# Patient Record
Sex: FEMALE | Race: WHITE | Employment: FULL TIME | URBAN - METROPOLITAN AREA
[De-identification: names, ages, dates, MRNs, and addresses within clinical notes are randomized per-mention and may not be internally consistent; named-entity substitution may affect disease eponyms.]

---

## 2017-03-17 PROBLEM — E07.9 THYROID DISORDER: Status: ACTIVE | Noted: 2017-03-17

## 2017-03-17 PROCEDURE — 86376 MICROSOMAL ANTIBODY EACH: CPT | Performed by: INTERNAL MEDICINE

## 2017-03-17 PROCEDURE — 86800 THYROGLOBULIN ANTIBODY: CPT | Performed by: INTERNAL MEDICINE

## 2017-03-17 PROCEDURE — 84445 ASSAY OF TSI GLOBULIN: CPT | Performed by: INTERNAL MEDICINE

## 2017-03-27 ENCOUNTER — APPOINTMENT (OUTPATIENT)
Dept: GENERAL RADIOLOGY | Facility: HOSPITAL | Age: 22
End: 2017-03-27
Attending: PEDIATRICS
Payer: COMMERCIAL

## 2017-03-27 ENCOUNTER — HOSPITAL ENCOUNTER (EMERGENCY)
Facility: HOSPITAL | Age: 22
Discharge: HOME | End: 2017-03-27
Attending: PEDIATRICS
Payer: COMMERCIAL

## 2017-03-27 VITALS
OXYGEN SATURATION: 98 % | DIASTOLIC BLOOD PRESSURE: 63 MMHG | RESPIRATION RATE: 16 BRPM | HEART RATE: 82 BPM | SYSTOLIC BLOOD PRESSURE: 127 MMHG | TEMPERATURE: 98 F | BODY MASS INDEX: 32.38 KG/M2 | WEIGHT: 171.5 LBS | HEIGHT: 61 IN
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
## 2017-03-27 DIAGNOSIS — S29.011A CHEST WALL MUSCLE STRAIN, INITIAL ENCOUNTER: Primary | ICD-10-CM

## 2017-03-27 LAB
ATRIAL RATE: 89 BPM
P AXIS: 70 DEGREES
P-R INTERVAL: 146 MS
Q-T INTERVAL: 348 MS
QRS DURATION: 82 MS
QTC CALCULATION (BEZET): 423 MS
R AXIS: 79 DEGREES
T AXIS: 41 DEGREES
VENTRICULAR RATE: 89 BPM

## 2017-03-27 PROCEDURE — 99283 EMERGENCY DEPT VISIT LOW MDM: CPT

## 2017-03-27 PROCEDURE — 93010 ELECTROCARDIOGRAM REPORT: CPT

## 2017-03-27 PROCEDURE — 99285 EMERGENCY DEPT VISIT HI MDM: CPT

## 2017-03-27 PROCEDURE — 71020 XR CHEST PA + LAT CHEST (CPT=71020): CPT

## 2017-03-27 PROCEDURE — 93005 ELECTROCARDIOGRAM TRACING: CPT

## 2017-03-27 RX ORDER — IBUPROFEN 600 MG/1
600 TABLET ORAL ONCE
Status: COMPLETED | OUTPATIENT
Start: 2017-03-27 | End: 2017-03-27
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
## 2017-12-16 ENCOUNTER — OFFICE VISIT (OUTPATIENT)
Dept: OBGYN CLINIC | Facility: CLINIC | Age: 22
End: 2017-12-16

## 2017-12-16 VITALS
HEIGHT: 62 IN | WEIGHT: 168 LBS | HEART RATE: 72 BPM | RESPIRATION RATE: 16 BRPM | DIASTOLIC BLOOD PRESSURE: 68 MMHG | BODY MASS INDEX: 30.91 KG/M2 | SYSTOLIC BLOOD PRESSURE: 102 MMHG

## 2017-12-16 DIAGNOSIS — N91.2 AMENORRHEA: Primary | ICD-10-CM

## 2017-12-16 PROBLEM — N91.1 AMENORRHEA, SECONDARY: Status: ACTIVE | Noted: 2017-12-16

## 2017-12-16 PROCEDURE — 99204 OFFICE O/P NEW MOD 45 MIN: CPT | Performed by: OBSTETRICS & GYNECOLOGY

## 2017-12-16 RX ORDER — MEDROXYPROGESTERONE ACETATE 10 MG/1
10 TABLET ORAL DAILY
Qty: 10 TABLET | Refills: 4 | Status: SHIPPED | OUTPATIENT
Start: 2017-12-16 | End: 2020-07-09

## 2017-12-16 NOTE — PROGRESS NOTES
Carolyn Serrato is a 23year old female  Patient's last menstrual period was 2017 (approximate). Patient presents with:  Wellness Visit: annual. new pt.    .Patient had menarche at 17 and her periods were always irregular with clots, she would heartburn, abdominal pain, diarrhea or constipation  Genitourinary:  denies dysuria, incontinence, abnormal vaginal discharge, vaginal itching  Musculoskeletal:  denies back pain. Skin/Breast:  Denies any breast pain, lumps, or discharge.    Neurological:

## 2017-12-19 ENCOUNTER — MED REC SCAN ONLY (OUTPATIENT)
Dept: OBGYN CLINIC | Facility: CLINIC | Age: 22
End: 2017-12-19

## 2020-05-31 ENCOUNTER — HOSPITAL ENCOUNTER (EMERGENCY)
Facility: HOSPITAL | Age: 25
Discharge: HOME | End: 2020-05-31
Attending: EMERGENCY MEDICINE
Payer: MEDICAID

## 2020-05-31 ENCOUNTER — APPOINTMENT (OUTPATIENT)
Dept: GENERAL RADIOLOGY | Facility: HOSPITAL | Age: 25
End: 2020-05-31
Attending: EMERGENCY MEDICINE
Payer: MEDICAID

## 2020-05-31 VITALS
RESPIRATION RATE: 18 BRPM | TEMPERATURE: 98 F | BODY MASS INDEX: 31.63 KG/M2 | DIASTOLIC BLOOD PRESSURE: 45 MMHG | HEART RATE: 89 BPM | WEIGHT: 167.56 LBS | SYSTOLIC BLOOD PRESSURE: 144 MMHG | OXYGEN SATURATION: 100 % | HEIGHT: 61 IN

## 2020-05-31 DIAGNOSIS — S92.512A CLOSED DISPLACED FRACTURE OF PROXIMAL PHALANX OF LESSER TOE OF LEFT FOOT, INITIAL ENCOUNTER: Primary | ICD-10-CM

## 2020-05-31 DIAGNOSIS — T07.XXXA MULTIPLE ABRASIONS: ICD-10-CM

## 2020-05-31 PROCEDURE — 73630 X-RAY EXAM OF FOOT: CPT | Performed by: EMERGENCY MEDICINE

## 2020-05-31 PROCEDURE — 28510 TREATMENT OF TOE FRACTURE: CPT

## 2020-05-31 PROCEDURE — 99283 EMERGENCY DEPT VISIT LOW MDM: CPT

## 2020-05-31 PROCEDURE — 73610 X-RAY EXAM OF ANKLE: CPT | Performed by: EMERGENCY MEDICINE

## 2020-05-31 NOTE — ED PROVIDER NOTES
Patient Seen in: [facility] Emergency Department      History   Patient presents with:  Trauma    Stated Complaint: abrasions, left ankle and toe pain, did not hit head, no LOC    HPI    42-year-old female comes to the hospital complaint of having d Exam    HEENT : NCAT, EOMI, PEERL, throat clear, neck supple and nontender, no JVD, trachea midline, No LAD  Heart: S1S2 normal. No murmurs, regular rate and rhythm, nontender  Lungs: Clear to auscultation bilaterally  Abdomen: Soft nontender nondistended phalanx of the left 5th toe consistent with fracture. This extends to involve the 5th MTP joint. No dislocation.    Dictated by: Leonardo Kruger MD on 5/31/2020 at 4:16 PM     Finalized by: Leonardo Kruger MD on 5/31/2020 at 4:18 PM            Patient's wou
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
## 2020-06-29 DIAGNOSIS — R42 DIZZINESS: Primary | ICD-10-CM

## 2020-07-02 ENCOUNTER — OFFICE VISIT (OUTPATIENT)
Dept: OBGYN CLINIC | Facility: CLINIC | Age: 25
End: 2020-07-02
Payer: MEDICAID

## 2020-07-02 VITALS
WEIGHT: 145 LBS | DIASTOLIC BLOOD PRESSURE: 78 MMHG | SYSTOLIC BLOOD PRESSURE: 120 MMHG | HEIGHT: 62 IN | HEART RATE: 87 BPM | BODY MASS INDEX: 26.68 KG/M2

## 2020-07-02 DIAGNOSIS — Z01.419 ENCOUNTER FOR WELL WOMAN EXAM WITH ROUTINE GYNECOLOGICAL EXAM: Primary | ICD-10-CM

## 2020-07-02 DIAGNOSIS — Z12.4 CERVICAL CANCER SCREENING: ICD-10-CM

## 2020-07-02 PROCEDURE — 88175 CYTOPATH C/V AUTO FLUID REDO: CPT | Performed by: OBSTETRICS & GYNECOLOGY

## 2020-07-02 PROCEDURE — 99395 PREV VISIT EST AGE 18-39: CPT | Performed by: OBSTETRICS & GYNECOLOGY

## 2020-07-02 PROCEDURE — 87491 CHLMYD TRACH DNA AMP PROBE: CPT | Performed by: OBSTETRICS & GYNECOLOGY

## 2020-07-02 PROCEDURE — 87591 N.GONORRHOEAE DNA AMP PROB: CPT | Performed by: OBSTETRICS & GYNECOLOGY
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
## 2020-07-03 LAB
C TRACH DNA SPEC QL NAA+PROBE: NEGATIVE
N GONORRHOEA DNA SPEC QL NAA+PROBE: NEGATIVE

## 2020-07-08 ENCOUNTER — HOSPITAL ENCOUNTER (OUTPATIENT)
Dept: CARDIOLOGY | Age: 25
Discharge: HOME | End: 2020-07-08
Attending: INTERNAL MEDICINE

## 2020-07-08 ENCOUNTER — HOSPITAL ENCOUNTER (EMERGENCY)
Facility: HOSPITAL | Age: 25
Discharge: HOME | End: 2020-07-08
Attending: EMERGENCY MEDICINE
Payer: MEDICAID

## 2020-07-08 VITALS
RESPIRATION RATE: 17 BRPM | HEART RATE: 95 BPM | TEMPERATURE: 98 F | WEIGHT: 145 LBS | SYSTOLIC BLOOD PRESSURE: 129 MMHG | HEIGHT: 62 IN | DIASTOLIC BLOOD PRESSURE: 62 MMHG | BODY MASS INDEX: 26.68 KG/M2 | OXYGEN SATURATION: 100 %

## 2020-07-08 DIAGNOSIS — R42 DIZZINESS: ICD-10-CM

## 2020-07-08 DIAGNOSIS — T78.40XA ALLERGIC REACTION, INITIAL ENCOUNTER: Primary | ICD-10-CM

## 2020-07-08 PROCEDURE — 93306 TTE W/DOPPLER COMPLETE: CPT

## 2020-07-08 PROCEDURE — 96372 THER/PROPH/DIAG INJ SC/IM: CPT

## 2020-07-08 PROCEDURE — 99283 EMERGENCY DEPT VISIT LOW MDM: CPT

## 2020-07-08 RX ORDER — PREDNISONE 20 MG/1
40 TABLET ORAL ONCE
Status: COMPLETED | OUTPATIENT
Start: 2020-07-08 | End: 2020-07-08

## 2020-07-08 RX ORDER — PREDNISONE 20 MG/1
40 TABLET ORAL DAILY
Qty: 8 TABLET | Refills: 0 | Status: ON HOLD | OUTPATIENT
Start: 2020-07-08 | End: 2020-07-11

## 2020-07-08 RX ORDER — DIPHENHYDRAMINE HCL 25 MG
25 CAPSULE ORAL ONCE
Status: COMPLETED | OUTPATIENT
Start: 2020-07-08 | End: 2020-07-08

## 2020-07-08 NOTE — ED PROVIDER NOTES
Patient Seen in: [facility] Emergency Department      History   Patient presents with:  Rash Skin Problem    Stated Complaint: rash, no sob, took 1 benadryl at 5:30pm    HPI 26-year-old female complaining of rash 26-year-old female complaint of rash lesions consistent with urticaria about the trunk arms and legs. ED Course   Labs Reviewed - No data to display       Patient received IM epinephrine and p.o. Benadryl and p.o. prednisone.         MDM     Patient was observed by an hour had significant i

## 2020-07-08 NOTE — ED INITIAL ASSESSMENT (HPI)
Pt to ED with complaints of rash and itching all over. Pt reports taking benadryl at 1730 without relief.

## 2020-07-09 ENCOUNTER — HOSPITAL ENCOUNTER (OUTPATIENT)
Facility: HOSPITAL | Age: 25
Setting detail: OBSERVATION
Discharge: HOME | End: 2020-07-11
Attending: EMERGENCY MEDICINE | Admitting: HOSPITALIST
Payer: MEDICAID

## 2020-07-09 ENCOUNTER — HOSPITAL ENCOUNTER (EMERGENCY)
Facility: HOSPITAL | Age: 25
Discharge: HOME | End: 2020-07-09
Attending: EMERGENCY MEDICINE
Payer: MEDICAID

## 2020-07-09 VITALS
BODY MASS INDEX: 26.68 KG/M2 | RESPIRATION RATE: 18 BRPM | HEART RATE: 88 BPM | OXYGEN SATURATION: 100 % | DIASTOLIC BLOOD PRESSURE: 57 MMHG | HEIGHT: 62 IN | WEIGHT: 145 LBS | SYSTOLIC BLOOD PRESSURE: 127 MMHG | TEMPERATURE: 100 F

## 2020-07-09 DIAGNOSIS — T78.40XD ACUTE ALLERGIC REACTION, SUBSEQUENT ENCOUNTER: Primary | ICD-10-CM

## 2020-07-09 DIAGNOSIS — T78.2XXD ANAPHYLAXIS, SUBSEQUENT ENCOUNTER: Primary | ICD-10-CM

## 2020-07-09 PROBLEM — T78.2XXA ACUTE ANAPHYLAXIS: Status: ACTIVE | Noted: 2020-07-09

## 2020-07-09 LAB
ALBUMIN SERPL-MCNC: 3.3 G/DL (ref 3.4–5)
ALBUMIN/GLOB SERPL: 0.8 {RATIO} (ref 1–2)
ALP LIVER SERPL-CCNC: 56 U/L (ref 52–144)
ALT SERPL-CCNC: 32 U/L (ref 13–56)
ANION GAP SERPL CALC-SCNC: 8 MMOL/L (ref 0–18)
AST SERPL-CCNC: 15 U/L (ref 15–37)
BASOPHILS # BLD AUTO: 0.02 X10(3) UL (ref 0–0.2)
BASOPHILS NFR BLD AUTO: 0.1 %
BILIRUB SERPL-MCNC: 0.4 MG/DL (ref 0.1–2)
BUN BLD-MCNC: 8 MG/DL (ref 7–18)
BUN/CREAT SERPL: 10.7 (ref 10–20)
CALCIUM BLD-MCNC: 9 MG/DL (ref 8.5–10.1)
CHLORIDE SERPL-SCNC: 108 MMOL/L (ref 98–112)
CO2 SERPL-SCNC: 22 MMOL/L (ref 21–32)
CREAT BLD-MCNC: 0.75 MG/DL (ref 0.55–1.02)
DEPRECATED RDW RBC AUTO: 43.8 FL (ref 35.1–46.3)
EOSINOPHIL # BLD AUTO: 0.01 X10(3) UL (ref 0–0.7)
EOSINOPHIL NFR BLD AUTO: 0.1 %
ERYTHROCYTE [DISTWIDTH] IN BLOOD BY AUTOMATED COUNT: 13 % (ref 11–15)
GLOBULIN PLAS-MCNC: 4 G/DL (ref 2.8–4.4)
GLUCOSE BLD-MCNC: 209 MG/DL (ref 70–99)
HCT VFR BLD AUTO: 38.6 % (ref 35–48)
HGB BLD-MCNC: 12.7 G/DL (ref 12–16)
IMM GRANULOCYTES # BLD AUTO: 0.08 X10(3) UL (ref 0–1)
IMM GRANULOCYTES NFR BLD: 0.4 %
LYMPHOCYTES # BLD AUTO: 2.08 X10(3) UL (ref 1–4)
LYMPHOCYTES NFR BLD AUTO: 10.6 %
M PROTEIN MFR SERPL ELPH: 7.3 G/DL (ref 6.4–8.2)
MCH RBC QN AUTO: 30.2 PG (ref 26–34)
MCHC RBC AUTO-ENTMCNC: 32.9 G/DL (ref 31–37)
MCV RBC AUTO: 91.9 FL (ref 80–100)
MONOCYTES # BLD AUTO: 0.21 X10(3) UL (ref 0.1–1)
MONOCYTES NFR BLD AUTO: 1.1 %
NEUTROPHILS # BLD AUTO: 17.28 X10 (3) UL (ref 1.5–7.7)
NEUTROPHILS # BLD AUTO: 17.28 X10(3) UL (ref 1.5–7.7)
NEUTROPHILS NFR BLD AUTO: 87.7 %
OSMOLALITY SERPL CALC.SUM OF ELEC: 290 MOSM/KG (ref 275–295)
PLATELET # BLD AUTO: 388 10(3)UL (ref 150–450)
POCT URINE PREGNANCY: NEGATIVE
POTASSIUM SERPL-SCNC: 3 MMOL/L (ref 3.5–5.1)
PROCEDURE CONTROL: YES
RBC # BLD AUTO: 4.2 X10(6)UL (ref 3.8–5.3)
SARS-COV-2 RNA RESP QL NAA+PROBE: NOT DETECTED
SODIUM SERPL-SCNC: 138 MMOL/L (ref 136–145)
TROPONIN I SERPL-MCNC: <0.045 NG/ML (ref ?–0.04)
WBC # BLD AUTO: 19.7 X10(3) UL (ref 4–11)

## 2020-07-09 PROCEDURE — 96375 TX/PRO/DX INJ NEW DRUG ADDON: CPT

## 2020-07-09 PROCEDURE — 99220 INITIAL OBSERVATION CARE,LEVL III: CPT | Performed by: HOSPITALIST

## 2020-07-09 PROCEDURE — 93005 ELECTROCARDIOGRAM TRACING: CPT

## 2020-07-09 PROCEDURE — 96374 THER/PROPH/DIAG INJ IV PUSH: CPT

## 2020-07-09 PROCEDURE — 93010 ELECTROCARDIOGRAM REPORT: CPT

## 2020-07-09 PROCEDURE — 99285 EMERGENCY DEPT VISIT HI MDM: CPT

## 2020-07-09 PROCEDURE — 99284 EMERGENCY DEPT VISIT MOD MDM: CPT

## 2020-07-09 RX ORDER — METHYLPREDNISOLONE SODIUM SUCCINATE 40 MG/ML
40 INJECTION, POWDER, LYOPHILIZED, FOR SOLUTION INTRAMUSCULAR; INTRAVENOUS EVERY 12 HOURS
Status: DISCONTINUED | OUTPATIENT
Start: 2020-07-09 | End: 2020-07-11

## 2020-07-09 RX ORDER — BIOTIN 1 MG
1 TABLET ORAL DAILY
COMMUNITY

## 2020-07-09 RX ORDER — DIPHENHYDRAMINE HYDROCHLORIDE 50 MG/ML
25 INJECTION INTRAMUSCULAR; INTRAVENOUS ONCE
Status: COMPLETED | OUTPATIENT
Start: 2020-07-09 | End: 2020-07-09

## 2020-07-09 RX ORDER — PREDNISONE 20 MG/1
40 TABLET ORAL ONCE
Status: COMPLETED | OUTPATIENT
Start: 2020-07-09 | End: 2020-07-09

## 2020-07-09 RX ORDER — PREDNISONE 20 MG/1
60 TABLET ORAL DAILY
Qty: 15 TABLET | Refills: 0 | Status: ON HOLD | OUTPATIENT
Start: 2020-07-09 | End: 2020-07-11

## 2020-07-09 RX ORDER — DIPHENHYDRAMINE HYDROCHLORIDE 50 MG/ML
25 INJECTION INTRAMUSCULAR; INTRAVENOUS ONCE
Status: COMPLETED | OUTPATIENT
Start: 2020-07-09 | End: 2020-07-11

## 2020-07-09 RX ORDER — METHYLPREDNISOLONE SODIUM SUCCINATE 125 MG/2ML
125 INJECTION, POWDER, LYOPHILIZED, FOR SOLUTION INTRAMUSCULAR; INTRAVENOUS ONCE
Status: COMPLETED | OUTPATIENT
Start: 2020-07-09 | End: 2020-07-09

## 2020-07-09 RX ORDER — DIPHENHYDRAMINE HYDROCHLORIDE 50 MG/ML
INJECTION INTRAMUSCULAR; INTRAVENOUS
Status: COMPLETED
Start: 2020-07-09 | End: 2020-07-09

## 2020-07-09 RX ORDER — FAMOTIDINE 10 MG/ML
20 INJECTION, SOLUTION INTRAVENOUS ONCE
Status: COMPLETED | OUTPATIENT
Start: 2020-07-09 | End: 2020-07-09

## 2020-07-09 RX ORDER — FAMOTIDINE 10 MG/ML
20 INJECTION, SOLUTION INTRAVENOUS 2 TIMES DAILY
Status: DISCONTINUED | OUTPATIENT
Start: 2020-07-10 | End: 2020-07-11

## 2020-07-09 RX ORDER — ONDANSETRON 2 MG/ML
4 INJECTION INTRAMUSCULAR; INTRAVENOUS EVERY 4 HOURS PRN
Status: DISCONTINUED | OUTPATIENT
Start: 2020-07-09 | End: 2020-07-09

## 2020-07-09 RX ORDER — POTASSIUM CHLORIDE 20 MEQ/1
40 TABLET, EXTENDED RELEASE ORAL EVERY 4 HOURS
Status: COMPLETED | OUTPATIENT
Start: 2020-07-09 | End: 2020-07-10

## 2020-07-09 RX ORDER — ONDANSETRON 2 MG/ML
4 INJECTION INTRAMUSCULAR; INTRAVENOUS EVERY 6 HOURS PRN
Status: DISCONTINUED | OUTPATIENT
Start: 2020-07-09 | End: 2020-07-11

## 2020-07-09 RX ORDER — ACETAMINOPHEN 325 MG/1
650 TABLET ORAL EVERY 6 HOURS PRN
Status: DISCONTINUED | OUTPATIENT
Start: 2020-07-09 | End: 2020-07-11

## 2020-07-09 RX ORDER — DIPHENHYDRAMINE HYDROCHLORIDE 50 MG/ML
25 INJECTION INTRAMUSCULAR; INTRAVENOUS EVERY 6 HOURS
Status: DISCONTINUED | OUTPATIENT
Start: 2020-07-09 | End: 2020-07-11

## 2020-07-09 NOTE — ED INITIAL ASSESSMENT (HPI)
Pt c/o allergic reaction. Pt was here last night for the same thing and was d/c. Today pt noticed a rash on bilateral arms and chest. Pt took benadryl and noticed her throat felt tight, called 911. Given IM epi PTA.  No further breathing complaints, noticea

## 2020-07-09 NOTE — ED PROVIDER NOTES
Patient Seen in: [facility] Emergency Department      History   Patient presents with:   Allergic Rxn Allergies    Stated Complaint: allergic reaction, seen here yesterday for same reaction but worse this morning*    HPI    Patient is a 24-year-old f °C) (Temporal)   Resp 18   Ht 157.5 cm (5' 2\")   Wt 65.8 kg   LMP 06/23/2020   SpO2 100%   BMI 26.52 kg/m²         Physical Exam  Vitals signs and nursing note reviewed. Constitutional:       Appearance: She is well-developed.    HENT:      Head: Normoce noted patient became very tachycardic. Patient is tachypneic and reports she feels her heart racing. Narrow complex tachycardia about 150 on the monitor. EKG as above demonstrates sinus tachycardia, not SVT or atrial fibrillation.   Suspect on a medicati

## 2020-07-09 NOTE — ED PROVIDER NOTES
Patient Seen in: [facility] Emergency Department      History   Patient presents with: Allergic Rxn Allergies    Stated Complaint: allergic reaction    HPI    73-year-old otherwise healthy female presents today for possible allergic reaction.   Giovanna °C) (Temporal)   Resp 17   Ht 157.5 cm (5' 2\")   Wt 67.6 kg   LMP 06/23/2020   SpO2 98%   BMI 27.26 kg/m²         Physical Exam  Vitals signs and nursing note reviewed. Constitutional:       Appearance: Normal appearance.    HENT:      Head: Normocephali (*)     All other components within normal limits   TROPONIN I - Normal   POTASSIUM - Normal   POCT PREGNANCY, URINE - Normal   RAPID SARS-COV-2 BY PCR - Normal   CBC WITH DIFFERENTIAL WITH PLATELET    Narrative:      The following orders were created for p involved direct patient intervention, complex decision making, and/or extensive discussions with the patient, family, and clinical staff.     Admission disposition: 7/9/2020  4:46 PM                 Disposition and Plan     Clinical Impression:  Anaphylaxis

## 2020-07-09 NOTE — ED NOTES
Per edmd pt is able to be discharged after one hour post epi administration, pt states rash is improving, continues to states no shortness or breath, no scratchy throat.  Pt educated on need to come back to be re-evaluated if symptoms worsen or she is have

## 2020-07-09 NOTE — ED NOTES
While giving steroid, patients heart rate increased form 116 to 170's. Steroid injection stopped after 1mL was infused, NS infusion initiated and MD notified who is at bedside w/ EKG completed at this time.

## 2020-07-09 NOTE — ED INITIAL ASSESSMENT (HPI)
Pt here for possible allergic rx to sunscreen ~ started yesterday at 1pm, seen here yesterday and got the allergic reaction meds but reports that reaction got worse since then

## 2020-07-09 NOTE — CONSULTS
[facility] PULMONARY/CRITICAL CARE CONSULTATION       HPI: Lauren Loomis is a 23year old female with a history of OCD, IBS, thyroid disease who presented to the ER yesterday with rash and pruritus.  She was swimming at the beach in [place], started noticing a PAST SURGICAL HISTORY    History reviewed. No pertinent surgical history. HOME MEDICATIONS      Prior to Admission Medications   Prescriptions Last Dose Informant Patient Reported? Taking? Coenzyme Q10 (COQ-10 OR)   Yes No   Sig: Take by mouth daily. Substance and Sexual Activity      Alcohol use: Yes        Comment: socially      Drug use: No      Sexual activity: Yes        Birth control/protection: Pill    Lifestyle      Physical activity:        Days per week: Not on file        Minutes per sessi Abdomen - soft, nontender to palpation. Extremities - No cyanosis, clubbing, edema appreciated. Skin - erythematous rash on legs, arms, neck, face; slightly raised, pruritic.        Labs:    Recent Labs   Lab 07/09/20  1343   WBC 19.7*   HGB 12.7   PLT

## 2020-07-10 LAB
ANION GAP SERPL CALC-SCNC: 2 MMOL/L (ref 0–18)
ATRIAL RATE: 134 BPM
ATRIAL RATE: 152 BPM
BUN BLD-MCNC: 10 MG/DL (ref 7–18)
BUN/CREAT SERPL: 14.3 (ref 10–20)
CALCIUM BLD-MCNC: 8.7 MG/DL (ref 8.5–10.1)
CHLORIDE SERPL-SCNC: 111 MMOL/L (ref 98–112)
CO2 SERPL-SCNC: 26 MMOL/L (ref 21–32)
CREAT BLD-MCNC: 0.7 MG/DL (ref 0.55–1.02)
DEPRECATED RDW RBC AUTO: 46.8 FL (ref 35.1–46.3)
ERYTHROCYTE [DISTWIDTH] IN BLOOD BY AUTOMATED COUNT: 13.2 % (ref 11–15)
GLUCOSE BLD-MCNC: 107 MG/DL (ref 70–99)
HCT VFR BLD AUTO: 36.8 % (ref 35–48)
HGB BLD-MCNC: 11.6 G/DL (ref 12–16)
MCH RBC QN AUTO: 30.4 PG (ref 26–34)
MCHC RBC AUTO-ENTMCNC: 31.5 G/DL (ref 31–37)
MCV RBC AUTO: 96.3 FL (ref 80–100)
OSMOLALITY SERPL CALC.SUM OF ELEC: 288 MOSM/KG (ref 275–295)
P AXIS: 81 DEGREES
P-R INTERVAL: 128 MS
P-R INTERVAL: 132 MS
PLATELET # BLD AUTO: 297 10(3)UL (ref 150–450)
POTASSIUM SERPL-SCNC: 4.9 MMOL/L (ref 3.5–5.1)
POTASSIUM SERPL-SCNC: 4.9 MMOL/L (ref 3.5–5.1)
Q-T INTERVAL: 326 MS
Q-T INTERVAL: 382 MS
QRS DURATION: 78 MS
QRS DURATION: 82 MS
QTC CALCULATION (BEZET): 518 MS
QTC CALCULATION (BEZET): 570 MS
R AXIS: 86 DEGREES
R AXIS: 97 DEGREES
RBC # BLD AUTO: 3.82 X10(6)UL (ref 3.8–5.3)
SODIUM SERPL-SCNC: 139 MMOL/L (ref 136–145)
T AXIS: 23 DEGREES
T AXIS: 23 DEGREES
VENTRICULAR RATE: 134 BPM
VENTRICULAR RATE: 152 BPM
WBC # BLD AUTO: 17.4 X10(3) UL (ref 4–11)

## 2020-07-10 PROCEDURE — 83735 ASSAY OF MAGNESIUM: CPT | Performed by: EMERGENCY MEDICINE

## 2020-07-10 PROCEDURE — 99225 SUBSEQUENT OBSERVATION CARE: CPT | Performed by: HOSPITALIST

## 2020-07-10 PROCEDURE — 83880 ASSAY OF NATRIURETIC PEPTIDE: CPT | Performed by: EMERGENCY MEDICINE

## 2020-07-10 PROCEDURE — 84443 ASSAY THYROID STIM HORMONE: CPT | Performed by: EMERGENCY MEDICINE

## 2020-07-10 RX ORDER — ENOXAPARIN SODIUM 100 MG/ML
40 INJECTION SUBCUTANEOUS DAILY
Status: DISCONTINUED | OUTPATIENT
Start: 2020-07-10 | End: 2020-07-11

## 2020-07-10 RX ORDER — DIPHENHYDRAMINE HYDROCHLORIDE 50 MG/ML
25 INJECTION INTRAMUSCULAR; INTRAVENOUS EVERY 4 HOURS PRN
Status: DISCONTINUED | OUTPATIENT
Start: 2020-07-10 | End: 2020-07-11

## 2020-07-10 NOTE — PLAN OF CARE
Assumed care after RN report; pt resting in bed. Initially w/ rash/redness started on BUE; neck. CC DEE DEEN HAMMAD Licona called; 1x dose of Benadryl given; rash resolved. Afebrile. SR-St per monitor; BP stable. Lungs clear. Up to BR to void.  Updated on [email or web address], will

## 2020-07-10 NOTE — PLAN OF CARE
Assume care in am. Patient awake and alert. Rashes and itching flaring up after two hours past after benadryl was given. Denies swelling of throat or , vital signs stable. Patient up in bathroom. Diet tolerated. Continue to monitor patient progress.    H

## 2020-07-10 NOTE — PROGRESS NOTES
Pulmonary Progress Note     Assessment / Plan:  1. Skin rash/pruritus - seems c/w acute urticaria. DDx includes infection, insect bite/sting (can even be seen with bed bugs), medications, food exposures. Only new exposure is sunscreen.  Anaphylaxis is a con

## 2020-07-10 NOTE — PROGRESS NOTES
Pt received Solumedrol 125 mg in the Ed this morning around 7am.  During administration her HR increased to 150s transiently. She was concerned this was an allergy so listed it on her allergy list on admit this afternoon.       She is due for a dose of 40

## 2020-07-10 NOTE — PROGRESS NOTES
NURSING TRANSFER NOTE      Patient TRANSFERED via Wheelchair  Oriented to room. Safety precautions initiated. Bed in low position. Call light in reach. AOx4. Rashes and itching flares up at times. Benadryl seems to be helping.  Allergy MD to see t

## 2020-07-10 NOTE — PROGRESS NOTES
[facility]  Progress Note     Saddie Castleman Patient Status:  Observation    5/10/1995 MRN [id number]   [facility] 4SW-A Attending Renny Cole, [address] [facility] Day # 0 PCP Niru Velázquez MD     Chief Complaint: Rash    S: Patient seen Epic.    Medications:   • diphenhydrAMINE HCl  25 mg Intravenous Once   • MethylPREDNISolone Sodium Succ  40 mg Intravenous Q12H   • diphenhydrAMINE HCl  25 mg Intravenous Q6H   • famoTIDine  20 mg Intravenous BID       ASSESSMENT / PLAN:     1.  Urticaria

## 2020-07-11 VITALS
HEART RATE: 104 BPM | DIASTOLIC BLOOD PRESSURE: 55 MMHG | RESPIRATION RATE: 22 BRPM | TEMPERATURE: 98 F | OXYGEN SATURATION: 99 % | HEIGHT: 62 IN | SYSTOLIC BLOOD PRESSURE: 119 MMHG | BODY MASS INDEX: 28.16 KG/M2 | WEIGHT: 153 LBS

## 2020-07-11 PROCEDURE — 99217 OBSERVATION CARE DISCHARGE: CPT | Performed by: HOSPITALIST

## 2020-07-11 RX ORDER — PREDNISONE 10 MG/1
10 TABLET ORAL DAILY
Qty: 30 TABLET | Refills: 0 | Status: SHIPPED | OUTPATIENT
Start: 2020-07-11 | End: 2020-07-23

## 2020-07-11 RX ORDER — PREDNISONE 10 MG/1
10 TABLET ORAL DAILY
Qty: 30 TABLET | Refills: 0 | Status: SHIPPED | OUTPATIENT
Start: 2020-07-11 | End: 2020-07-11

## 2020-07-11 RX ORDER — DIPHENHYDRAMINE HCL 25 MG
25 CAPSULE ORAL EVERY 6 HOURS PRN
Qty: 30 CAPSULE | Refills: 0 | Status: SHIPPED | OUTPATIENT
Start: 2020-07-11 | End: 2020-08-31

## 2020-07-11 NOTE — CONSULTS
[facility]    Report of Consultation    Fartun Sushil Patient Status:  Observation    5/10/1995 MRN [id number]   [facility] 5NW-A Attending Suman Espinoza, [address] [facility] Day # 0 PCP Ledy Crooks MD     Date of Admission:  2020  Da [Other]) Father       reports that she has never smoked. She has never used smokeless tobacco. She reports current alcohol use. She reports that she does not use drugs.     Allergies:    Penicillins             RASH    Medications:    Current Facility-Admin cetirizine until seen in clinic. Business card provided, patient to schedule appointment. Recommendations: Thank you for allowing me to participate in the care of your patient.     [facility]  7/11/2020  12:58 PM

## 2020-07-11 NOTE — DISCHARGE SUMMARY
[facility]  DISCHARGE SUMMARY     Kevin Peng Patient Status:  Observation    5/10/1995 MRN [id number]   [facility] 5NW-A Attending No att. providers found   [facility] Day # 0 PCP Mary Hughes MD     Date of Admission: 2020  Da medications      Instructions Prescription details   predniSONE 10 MG Tabs  Commonly known as:  Rubin Wyman  What changed:    · medication strength  · how much to take  · additional instructions  · Another medication with the same name was removed.  Continue

## 2020-07-11 NOTE — PLAN OF CARE
Problem: Patient/Family Goals  Goal: Patient/Family Long Term Goal  Description  - See additional Care Plan goals for specific interventions  Outcome: Progressing  Goal: Patient/Family Short Term Goal  Description  Patient's Short Term Goal:  06/10/2020

## 2020-07-11 NOTE — PROGRESS NOTES
PATIENT A&OX4.  DENIES CHEST PAIN. DENIES SOB OR DIFFICULTY SWALLOWING. SPEAKING IN FULL SENTENCES. HER LUNGS ARE CLEAR. .  NO COUGH, NO FEVER. REDNESS/RASH NOTED TO FACE, NECK AND BOTH ARMS. REPORTS \"THEY ARE ITCHY BUT THE BENADRYL HELPS\".   SHE

## 2020-07-11 NOTE — PROGRESS NOTES
[facility]  Progress Note     Emily Parks Patient Status:  Observation    5/10/1995 MRN [id number]   [facility] 4SW-A Attending Arnie Asif, [address] [facility] Day # 0 PCP Ivana Jackson MD     Chief Complaint: Rash    S: Patient seen enoxaparin  40 mg Subcutaneous Daily   • MethylPREDNISolone Sodium Succ  40 mg Intravenous Q12H   • diphenhydrAMINE HCl  25 mg Intravenous Q6H   • famoTIDine  20 mg Intravenous BID       ASSESSMENT / PLAN:     1. Urticaria  1. Unclear etiology  2.  Steroid/

## 2020-07-11 NOTE — PROGRESS NOTES
Pt will be discharging home, script for prednisone called into pharm, IV and tele dcd. All dc instructions discussed

## 2020-07-12 ENCOUNTER — HOSPITAL ENCOUNTER (EMERGENCY)
Facility: HOSPITAL | Age: 25
Discharge: HOME | End: 2020-07-12
Attending: EMERGENCY MEDICINE
Payer: MEDICAID

## 2020-07-12 ENCOUNTER — APPOINTMENT (OUTPATIENT)
Dept: GENERAL RADIOLOGY | Facility: HOSPITAL | Age: 25
End: 2020-07-12
Attending: EMERGENCY MEDICINE
Payer: MEDICAID

## 2020-07-12 ENCOUNTER — APPOINTMENT (OUTPATIENT)
Dept: CT IMAGING | Facility: HOSPITAL | Age: 25
End: 2020-07-12
Attending: EMERGENCY MEDICINE
Payer: MEDICAID

## 2020-07-12 VITALS
HEART RATE: 67 BPM | DIASTOLIC BLOOD PRESSURE: 59 MMHG | TEMPERATURE: 98 F | BODY MASS INDEX: 26.68 KG/M2 | HEIGHT: 62 IN | RESPIRATION RATE: 18 BRPM | OXYGEN SATURATION: 99 % | WEIGHT: 145 LBS | SYSTOLIC BLOOD PRESSURE: 112 MMHG

## 2020-07-12 DIAGNOSIS — R00.2 PALPITATIONS: ICD-10-CM

## 2020-07-12 DIAGNOSIS — T78.40XA ALLERGIC REACTION, INITIAL ENCOUNTER: Primary | ICD-10-CM

## 2020-07-12 LAB
ALBUMIN SERPL-MCNC: 3.5 G/DL (ref 3.4–5)
ALBUMIN/GLOB SERPL: 0.8 {RATIO} (ref 1–2)
ALP LIVER SERPL-CCNC: 53 U/L (ref 52–144)
ALT SERPL-CCNC: 47 U/L (ref 13–56)
ANION GAP SERPL CALC-SCNC: 5 MMOL/L (ref 0–18)
AST SERPL-CCNC: 28 U/L (ref 15–37)
BILIRUB SERPL-MCNC: 0.5 MG/DL (ref 0.1–2)
BUN BLD-MCNC: 22 MG/DL (ref 7–18)
BUN/CREAT SERPL: 23.4 (ref 10–20)
CALCIUM BLD-MCNC: 9 MG/DL (ref 8.5–10.1)
CHLORIDE SERPL-SCNC: 104 MMOL/L (ref 98–112)
CO2 SERPL-SCNC: 26 MMOL/L (ref 21–32)
CREAT BLD-MCNC: 0.94 MG/DL (ref 0.55–1.02)
D-DIMER: 0.51 UG/ML FEU (ref ?–0.5)
GLOBULIN PLAS-MCNC: 4.2 G/DL (ref 2.8–4.4)
GLUCOSE BLD-MCNC: 72 MG/DL (ref 70–99)
HAV IGM SER QL: 2.3 MG/DL (ref 1.6–2.6)
M PROTEIN MFR SERPL ELPH: 7.7 G/DL (ref 6.4–8.2)
NT-PROBNP SERPL-MCNC: 239 PG/ML (ref ?–125)
OSMOLALITY SERPL CALC.SUM OF ELEC: 282 MOSM/KG (ref 275–295)
POTASSIUM SERPL-SCNC: 3.7 MMOL/L (ref 3.5–5.1)
SODIUM SERPL-SCNC: 135 MMOL/L (ref 136–145)
TROPONIN I SERPL-MCNC: <0.045 NG/ML (ref ?–0.04)
TSI SER-ACNC: 1.01 MIU/ML (ref 0.36–3.74)

## 2020-07-12 PROCEDURE — 96374 THER/PROPH/DIAG INJ IV PUSH: CPT

## 2020-07-12 PROCEDURE — 96375 TX/PRO/DX INJ NEW DRUG ADDON: CPT

## 2020-07-12 PROCEDURE — 84484 ASSAY OF TROPONIN QUANT: CPT | Performed by: EMERGENCY MEDICINE

## 2020-07-12 PROCEDURE — 99284 EMERGENCY DEPT VISIT MOD MDM: CPT

## 2020-07-12 PROCEDURE — 85379 FIBRIN DEGRADATION QUANT: CPT | Performed by: EMERGENCY MEDICINE

## 2020-07-12 PROCEDURE — 99285 EMERGENCY DEPT VISIT HI MDM: CPT

## 2020-07-12 PROCEDURE — 71275 CT ANGIOGRAPHY CHEST: CPT | Performed by: EMERGENCY MEDICINE

## 2020-07-12 PROCEDURE — 80053 COMPREHEN METABOLIC PANEL: CPT | Performed by: EMERGENCY MEDICINE

## 2020-07-12 PROCEDURE — 71045 X-RAY EXAM CHEST 1 VIEW: CPT | Performed by: EMERGENCY MEDICINE

## 2020-07-12 PROCEDURE — S0028 INJECTION, FAMOTIDINE, 20 MG: HCPCS | Performed by: EMERGENCY MEDICINE

## 2020-07-12 RX ORDER — FAMOTIDINE 10 MG/ML
20 INJECTION, SOLUTION INTRAVENOUS ONCE
Status: COMPLETED | OUTPATIENT
Start: 2020-07-12 | End: 2020-07-12

## 2020-07-12 RX ORDER — EPINEPHRINE 0.3 MG/.3ML
0.3 INJECTION SUBCUTANEOUS
Qty: 1 EACH | Refills: 0 | Status: SHIPPED | OUTPATIENT
Start: 2020-07-12 | End: 2020-08-11

## 2020-07-12 RX ORDER — METHYLPREDNISOLONE SODIUM SUCCINATE 125 MG/2ML
125 INJECTION, POWDER, LYOPHILIZED, FOR SOLUTION INTRAMUSCULAR; INTRAVENOUS ONCE
Status: COMPLETED | OUTPATIENT
Start: 2020-07-12 | End: 2020-07-12

## 2020-07-12 NOTE — ED PROVIDER NOTES
Patient Seen in: [facility] Emergency Department      History   Patient presents with:   Allergic Rxn Allergies    Stated Complaint: allergic rxn - rash only    HPI    51-year-old with a history of OCD, panic disorder, presents for reevaluation of an Amenorrhea, secondary    • Hyperhidrosis    • IBS (irritable bowel syndrome)    • OCD (obsessive compulsive disorder)    • Panic disorder    • Thyroid disorder               History reviewed. No pertinent surgical history.                  Social History Abnormal; Notable for the following components:    Pro-Beta Natriuretic Peptide 239 (*)     All other components within normal limits   D-DIMER - Abnormal; Notable for the following components:    D-Dimer 0.51 (*)     All other components within normal freitas no

## 2020-07-12 NOTE — ED INITIAL ASSESSMENT (HPI)
Pt here for rash that has been ongoing for a week. Pt was inpatient Thursday until yesterday when she was d/c. The allergist prescribed medications which she took am doses and the rash came back. Pt was free of the rash upon d/c.

## 2020-08-06 ENCOUNTER — HOSPITAL ENCOUNTER (EMERGENCY)
Facility: HOSPITAL | Age: 25
Discharge: HOME | End: 2020-08-06
Attending: EMERGENCY MEDICINE
Payer: MEDICAID

## 2020-08-06 VITALS
RESPIRATION RATE: 20 BRPM | WEIGHT: 145 LBS | BODY MASS INDEX: 27 KG/M2 | OXYGEN SATURATION: 99 % | SYSTOLIC BLOOD PRESSURE: 122 MMHG | TEMPERATURE: 98 F | DIASTOLIC BLOOD PRESSURE: 64 MMHG | HEART RATE: 79 BPM

## 2020-08-06 DIAGNOSIS — T50.905A ADVERSE EFFECT OF DRUG, INITIAL ENCOUNTER: Primary | ICD-10-CM

## 2020-08-06 DIAGNOSIS — E87.6 HYPOKALEMIA: ICD-10-CM

## 2020-08-06 LAB
ALBUMIN SERPL-MCNC: 3.2 G/DL (ref 3.4–5)
ALBUMIN/GLOB SERPL: 0.8 {RATIO} (ref 1–2)
ALP LIVER SERPL-CCNC: 50 U/L (ref 52–144)
ALT SERPL-CCNC: 21 U/L (ref 13–56)
ANION GAP SERPL CALC-SCNC: 5 MMOL/L (ref 0–18)
AST SERPL-CCNC: 14 U/L (ref 15–37)
ATRIAL RATE: 93 BPM
BASOPHILS # BLD AUTO: 0.03 X10(3) UL (ref 0–0.2)
BASOPHILS NFR BLD AUTO: 0.4 %
BILIRUB SERPL-MCNC: 0.3 MG/DL (ref 0.1–2)
BUN BLD-MCNC: 7 MG/DL (ref 7–18)
BUN/CREAT SERPL: 10.3 (ref 10–20)
CALCIUM BLD-MCNC: 8.9 MG/DL (ref 8.5–10.1)
CHLORIDE SERPL-SCNC: 107 MMOL/L (ref 98–112)
CO2 SERPL-SCNC: 24 MMOL/L (ref 21–32)
CREAT BLD-MCNC: 0.68 MG/DL (ref 0.55–1.02)
DEPRECATED RDW RBC AUTO: 43.4 FL (ref 35.1–46.3)
EOSINOPHIL # BLD AUTO: 0.18 X10(3) UL (ref 0–0.7)
EOSINOPHIL NFR BLD AUTO: 2.3 %
ERYTHROCYTE [DISTWIDTH] IN BLOOD BY AUTOMATED COUNT: 13 % (ref 11–15)
GLOBULIN PLAS-MCNC: 3.8 G/DL (ref 2.8–4.4)
GLUCOSE BLD-MCNC: 109 MG/DL (ref 70–99)
HCT VFR BLD AUTO: 35.7 % (ref 35–48)
HGB BLD-MCNC: 11.6 G/DL (ref 12–16)
IMM GRANULOCYTES # BLD AUTO: 0.02 X10(3) UL (ref 0–1)
IMM GRANULOCYTES NFR BLD: 0.3 %
LYMPHOCYTES # BLD AUTO: 3.16 X10(3) UL (ref 1–4)
LYMPHOCYTES NFR BLD AUTO: 39.5 %
M PROTEIN MFR SERPL ELPH: 7 G/DL (ref 6.4–8.2)
MCH RBC QN AUTO: 29.9 PG (ref 26–34)
MCHC RBC AUTO-ENTMCNC: 32.5 G/DL (ref 31–37)
MCV RBC AUTO: 92 FL (ref 80–100)
MONOCYTES # BLD AUTO: 0.65 X10(3) UL (ref 0.1–1)
MONOCYTES NFR BLD AUTO: 8.1 %
NEUTROPHILS # BLD AUTO: 3.95 X10 (3) UL (ref 1.5–7.7)
NEUTROPHILS # BLD AUTO: 3.95 X10(3) UL (ref 1.5–7.7)
NEUTROPHILS NFR BLD AUTO: 49.4 %
OSMOLALITY SERPL CALC.SUM OF ELEC: 281 MOSM/KG (ref 275–295)
P AXIS: 70 DEGREES
P-R INTERVAL: 162 MS
PLATELET # BLD AUTO: 230 10(3)UL (ref 150–450)
POCT LOT NUMBER: NORMAL
POCT URINE PREGNANCY: NEGATIVE
POTASSIUM SERPL-SCNC: 3.3 MMOL/L (ref 3.5–5.1)
Q-T INTERVAL: 354 MS
QRS DURATION: 84 MS
QTC CALCULATION (BEZET): 440 MS
R AXIS: 86 DEGREES
RBC # BLD AUTO: 3.88 X10(6)UL (ref 3.8–5.3)
SODIUM SERPL-SCNC: 136 MMOL/L (ref 136–145)
T AXIS: 29 DEGREES
TROPONIN I SERPL-MCNC: <0.045 NG/ML (ref ?–0.04)
VENTRICULAR RATE: 93 BPM
WBC # BLD AUTO: 8 X10(3) UL (ref 4–11)

## 2020-08-06 PROCEDURE — 80053 COMPREHEN METABOLIC PANEL: CPT | Performed by: EMERGENCY MEDICINE

## 2020-08-06 PROCEDURE — 85025 COMPLETE CBC W/AUTO DIFF WBC: CPT | Performed by: EMERGENCY MEDICINE

## 2020-08-06 PROCEDURE — 99284 EMERGENCY DEPT VISIT MOD MDM: CPT

## 2020-08-06 PROCEDURE — 84484 ASSAY OF TROPONIN QUANT: CPT | Performed by: EMERGENCY MEDICINE

## 2020-08-06 PROCEDURE — 93005 ELECTROCARDIOGRAM TRACING: CPT

## 2020-08-06 PROCEDURE — 81025 URINE PREGNANCY TEST: CPT

## 2020-08-06 PROCEDURE — 36415 COLL VENOUS BLD VENIPUNCTURE: CPT

## 2020-08-06 RX ORDER — LORAZEPAM 1 MG/1
1 TABLET ORAL ONCE
Status: COMPLETED | OUTPATIENT
Start: 2020-08-06 | End: 2020-08-06

## 2020-08-06 RX ORDER — ONDANSETRON 4 MG/1
4 TABLET, ORALLY DISINTEGRATING ORAL ONCE
Status: COMPLETED | OUTPATIENT
Start: 2020-08-06 | End: 2020-08-06

## 2020-08-06 RX ORDER — POTASSIUM CHLORIDE 20 MEQ/1
20 TABLET, EXTENDED RELEASE ORAL ONCE
Status: COMPLETED | OUTPATIENT
Start: 2020-08-06 | End: 2020-08-06

## 2020-08-06 NOTE — ED INITIAL ASSESSMENT (HPI)
25YF c/c of medication reaction Pt state that she took her mother sertaline 50mg medication and cut in half because she has not been able to fill her own rx.  Pt state that she her today because she feel dizzy and her heart racing

## 2020-08-06 NOTE — ED PROVIDER NOTES
Patient Seen in: [facility] Emergency Department      History   Patient presents with:  Medication Reaction    Stated Complaint: MEDICATION REACTION    HPI  This is a 82-year-old woman, here for evaluation of possible adverse reaction to sertraline HEENT:  Mucous membranes are moist.   Cardiovascular:  Normal rate and regular rhythm. No Edema  Pulmonary:  Pulmonary effort is normal.  Normal breath sounds. No wheezing, rhonchi or rales.    Abdominal: Soft nontender nondistended, no guarding no rebou sinus rhythm, and otherwise unremarkable.   She has taken 2 doses of this medication, have instructed her to stop this medication given the adverse reaction, advised her to follow-up with her primary doctor tomorrow for possible alternatives and for further

## 2020-08-31 ENCOUNTER — HOSPITAL ENCOUNTER (EMERGENCY)
Facility: HOSPITAL | Age: 25
Discharge: HOME | End: 2020-08-31
Attending: PEDIATRICS
Payer: MEDICAID

## 2020-08-31 VITALS
RESPIRATION RATE: 16 BRPM | WEIGHT: 145 LBS | OXYGEN SATURATION: 99 % | HEART RATE: 91 BPM | DIASTOLIC BLOOD PRESSURE: 73 MMHG | SYSTOLIC BLOOD PRESSURE: 120 MMHG | BODY MASS INDEX: 26.68 KG/M2 | TEMPERATURE: 98 F | HEIGHT: 62 IN

## 2020-08-31 DIAGNOSIS — L24.9 IRRITANT CONTACT DERMATITIS, UNSPECIFIED TRIGGER: Primary | ICD-10-CM

## 2020-08-31 PROCEDURE — 99283 EMERGENCY DEPT VISIT LOW MDM: CPT

## 2020-08-31 RX ORDER — CLOBETASOL PROPIONATE 0.5 MG/G
1 CREAM TOPICAL 2 TIMES DAILY
Qty: 1 TUBE | Refills: 0 | Status: SHIPPED | OUTPATIENT
Start: 2020-08-31

## 2020-08-31 NOTE — ED PROVIDER NOTES
Patient Seen in: [facility] Emergency Department      History   Patient presents with: Allergic Rxn Allergies    Stated Complaint: PROGRESSING RASH, ALLERGIC REACTION 1 MONTH AGO IN ICU. WORKS IN COVID UNIT.  NO*    HPI    28-year-old female here wi °C) (Temporal)   Resp 16   Ht 157.5 cm (5' 2\")   Wt 65.8 kg   LMP 08/09/2020   SpO2 99%   BMI 26.52 kg/m²         Physical Exam  Vitals signs and nursing note reviewed. Constitutional:       General: She is not in acute distress.      Appearance: She is place, and time. Cranial Nerves: No cranial nerve deficit. Motor: No abnormal muscle tone. Coordination: Coordination normal.   Psychiatric:         Behavior: Behavior normal.         Thought Content:  Thought content normal.         Judgment 1 Tube Refills: 0

## 2020-08-31 NOTE — ED INITIAL ASSESSMENT (HPI)
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

## 2020-10-23 ENCOUNTER — HOSPITAL ENCOUNTER (EMERGENCY)
Age: 25
Discharge: HOME | End: 2020-10-23
Attending: EMERGENCY MEDICINE

## 2020-10-23 VITALS
OXYGEN SATURATION: 99 % | BODY MASS INDEX: 24.79 KG/M2 | WEIGHT: 134.7 LBS | HEART RATE: 89 BPM | SYSTOLIC BLOOD PRESSURE: 121 MMHG | DIASTOLIC BLOOD PRESSURE: 74 MMHG | TEMPERATURE: 98.2 F | HEIGHT: 62 IN | RESPIRATION RATE: 18 BRPM

## 2020-10-23 DIAGNOSIS — R00.2 PALPITATIONS: Primary | ICD-10-CM

## 2020-10-23 DIAGNOSIS — I49.3 PVC (PREMATURE VENTRICULAR CONTRACTION): ICD-10-CM

## 2020-10-23 LAB
ANION GAP SERPL CALC-SCNC: 11 MMOL/L (ref 10–20)
APPEARANCE UR: CLEAR
BASOPHILS # BLD: 0.1 K/MCL (ref 0–0.3)
BASOPHILS NFR BLD: 1 %
BILIRUB UR QL STRIP: NEGATIVE
BUN SERPL-MCNC: 14 MG/DL (ref 6–20)
BUN/CREAT SERPL: 21 (ref 7–25)
CALCIUM SERPL-MCNC: 9.1 MG/DL (ref 8.4–10.2)
CHLORIDE SERPL-SCNC: 104 MMOL/L (ref 98–107)
CO2 SERPL-SCNC: 27 MMOL/L (ref 21–32)
COLOR UR: YELLOW
CREAT SERPL-MCNC: 0.65 MG/DL (ref 0.51–0.95)
DIFFERENTIAL METHOD BLD: ABNORMAL
EOSINOPHIL # BLD: 0 K/MCL (ref 0.1–0.5)
EOSINOPHIL NFR BLD: 0 %
ERYTHROCYTE [DISTWIDTH] IN BLOOD: 12.3 % (ref 11–15)
GLUCOSE SERPL-MCNC: 77 MG/DL (ref 65–99)
GLUCOSE UR STRIP-MCNC: NEGATIVE MG/DL
HCG UR QL: NEGATIVE
HCT VFR BLD CALC: 40.4 % (ref 36–46.5)
HGB BLD-MCNC: 13 G/DL (ref 12–15.5)
HGB UR QL STRIP: NEGATIVE
IMM GRANULOCYTES # BLD AUTO: 0 K/MCL (ref 0–0.2)
IMM GRANULOCYTES NFR BLD: 0 %
KETONES UR STRIP-MCNC: 15 MG/DL
LEUKOCYTE ESTERASE UR QL STRIP: NEGATIVE
LYMPHOCYTES # BLD: 2.3 K/MCL (ref 1–4.8)
LYMPHOCYTES NFR BLD: 33 %
MAGNESIUM SERPL-MCNC: 2.2 MG/DL (ref 1.7–2.4)
MCH RBC QN AUTO: 29.3 PG (ref 26–34)
MCHC RBC AUTO-ENTMCNC: 32.2 G/DL (ref 32–36.5)
MCV RBC AUTO: 91.2 FL (ref 78–100)
MONOCYTES # BLD: 0.5 K/MCL (ref 0.3–0.9)
MONOCYTES NFR BLD: 7 %
NEUTROPHILS # BLD: 4 K/MCL (ref 1.8–7.7)
NEUTROPHILS NFR BLD: 59 %
NITRITE UR QL STRIP: NEGATIVE
NRBC BLD MANUAL-RTO: 0 /100 WBC
PH UR STRIP: 5.5 UNITS (ref 5–7)
PLATELET # BLD: 319 K/MCL (ref 140–450)
POTASSIUM SERPL-SCNC: 3.8 MMOL/L (ref 3.4–5.1)
PROT UR STRIP-MCNC: NEGATIVE MG/DL
RBC # BLD: 4.43 MIL/MCL (ref 4–5.2)
SODIUM SERPL-SCNC: 138 MMOL/L (ref 135–145)
SP GR UR STRIP: 1.02 (ref 1–1.03)
UROBILINOGEN UR STRIP-MCNC: 0.2 MG/DL (ref 0–1)
WBC # BLD: 6.9 K/MCL (ref 4.2–11)

## 2020-10-23 PROCEDURE — 96360 HYDRATION IV INFUSION INIT: CPT

## 2020-10-23 PROCEDURE — 96361 HYDRATE IV INFUSION ADD-ON: CPT

## 2020-10-23 PROCEDURE — 83735 ASSAY OF MAGNESIUM: CPT

## 2020-10-23 PROCEDURE — 99285 EMERGENCY DEPT VISIT HI MDM: CPT

## 2020-10-23 PROCEDURE — 85025 COMPLETE CBC W/AUTO DIFF WBC: CPT

## 2020-10-23 PROCEDURE — 93005 ELECTROCARDIOGRAM TRACING: CPT | Performed by: EMERGENCY MEDICINE

## 2020-10-23 PROCEDURE — 81003 URINALYSIS AUTO W/O SCOPE: CPT

## 2020-10-23 PROCEDURE — 84703 CHORIONIC GONADOTROPIN ASSAY: CPT

## 2020-10-23 PROCEDURE — 10002807 HB RX 258: Performed by: EMERGENCY MEDICINE

## 2020-10-23 PROCEDURE — 80048 BASIC METABOLIC PNL TOTAL CA: CPT

## 2020-10-23 RX ADMIN — SODIUM CHLORIDE 1000 ML: 9 INJECTION, SOLUTION INTRAVENOUS at 13:41

## 2020-10-23 ASSESSMENT — ENCOUNTER SYMPTOMS
NAUSEA: 0
ABDOMINAL PAIN: 0
LIGHT-HEADEDNESS: 0
CHILLS: 0
BACK PAIN: 0
COUGH: 0
SHORTNESS OF BREATH: 0
ADENOPATHY: 0
SORE THROAT: 0
FEVER: 0
DIARRHEA: 0
DIZZINESS: 0
VOMITING: 0

## 2020-10-23 ASSESSMENT — PAIN SCALES - GENERAL: PAINLEVEL_OUTOF10: 3

## 2020-10-24 LAB
ATRIAL RATE (BPM): 86
P AXIS (DEGREES): 54
PR-INTERVAL (MSEC): 154
QRS-INTERVAL (MSEC): 82
QT-INTERVAL (MSEC): 372
QTC: 445
R AXIS (DEGREES): 86
REPORT TEXT: NORMAL
T AXIS (DEGREES): 31
VENTRICULAR RATE EKG/MIN (BPM): 86

## 2020-11-23 PROBLEM — I49.3 PVC'S (PREMATURE VENTRICULAR CONTRACTIONS): Status: ACTIVE | Noted: 2020-11-23

## 2020-11-23 PROBLEM — R00.2 PALPITATIONS: Status: ACTIVE | Noted: 2020-11-23

## 2020-11-24 ENCOUNTER — OFFICE VISIT (OUTPATIENT)
Dept: CARDIOLOGY | Age: 25
End: 2020-11-24

## 2020-11-24 VITALS
HEIGHT: 62 IN | DIASTOLIC BLOOD PRESSURE: 60 MMHG | BODY MASS INDEX: 28.71 KG/M2 | HEART RATE: 72 BPM | RESPIRATION RATE: 16 BRPM | SYSTOLIC BLOOD PRESSURE: 100 MMHG | WEIGHT: 156 LBS

## 2020-11-24 DIAGNOSIS — I49.3 PVC'S (PREMATURE VENTRICULAR CONTRACTIONS): ICD-10-CM

## 2020-11-24 DIAGNOSIS — R00.2 PALPITATIONS: Primary | ICD-10-CM

## 2020-11-24 PROCEDURE — 99243 OFF/OP CNSLTJ NEW/EST LOW 30: CPT | Performed by: INTERNAL MEDICINE

## 2020-11-24 RX ORDER — CHOLECALCIFEROL (VITAMIN D3) 125 MCG
500 CAPSULE ORAL DAILY
COMMUNITY

## 2020-11-24 RX ORDER — UBIDECARENONE 200 MG
1 CAPSULE ORAL DAILY
COMMUNITY

## 2020-11-24 SDOH — HEALTH STABILITY: PHYSICAL HEALTH: ON AVERAGE, HOW MANY DAYS PER WEEK DO YOU ENGAGE IN MODERATE TO STRENUOUS EXERCISE (LIKE A BRISK WALK)?: 4 DAYS

## 2020-11-24 ASSESSMENT — ENCOUNTER SYMPTOMS
WEIGHT GAIN: 0
SYNCOPE: 0
CHILLS: 0
DEPRESSION: 0
HEMOPTYSIS: 0
BACK PAIN: 0
WEAKNESS: 0
ALLERGIC/IMMUNOLOGIC COMMENTS: NO NEW FOOD ALLERGIES
LIGHT-HEADEDNESS: 1
SUSPICIOUS LESIONS: 0
HEMATOCHEZIA: 0
DIZZINESS: 1
BRUISES/BLEEDS EASILY: 0
FEVER: 0
WEIGHT LOSS: 0
COUGH: 0

## 2020-11-24 ASSESSMENT — PATIENT HEALTH QUESTIONNAIRE - PHQ9
2. FEELING DOWN, DEPRESSED OR HOPELESS: NOT AT ALL
CLINICAL INTERPRETATION OF PHQ2 SCORE: NO FURTHER SCREENING NEEDED
CLINICAL INTERPRETATION OF PHQ9 SCORE: NO FURTHER SCREENING NEEDED
SUM OF ALL RESPONSES TO PHQ9 QUESTIONS 1 AND 2: 0
SUM OF ALL RESPONSES TO PHQ9 QUESTIONS 1 AND 2: 0
1. LITTLE INTEREST OR PLEASURE IN DOING THINGS: NOT AT ALL

## 2020-12-31 ENCOUNTER — IMMUNIZATION (OUTPATIENT)
Dept: LAB | Age: 25
End: 2020-12-31

## 2020-12-31 DIAGNOSIS — Z23 NEED FOR VACCINATION: Primary | ICD-10-CM

## 2020-12-31 PROCEDURE — 91301 COVID-19 MODERNA VACCINE: CPT | Performed by: NURSE PRACTITIONER

## 2020-12-31 PROCEDURE — 0011A COVID-19 MODERNA VACCINE: CPT | Performed by: NURSE PRACTITIONER

## 2021-01-15 ENCOUNTER — HOSPITAL ENCOUNTER (OUTPATIENT)
Dept: CARDIOLOGY | Age: 26
Discharge: HOME | End: 2021-01-15
Attending: INTERNAL MEDICINE

## 2021-01-15 DIAGNOSIS — I49.3 PVC'S (PREMATURE VENTRICULAR CONTRACTIONS): ICD-10-CM

## 2021-01-15 DIAGNOSIS — R00.2 PALPITATIONS: ICD-10-CM

## 2021-01-15 PROCEDURE — 93225 XTRNL ECG REC<48 HRS REC: CPT

## 2021-01-15 PROCEDURE — 93227 XTRNL ECG REC<48 HR R&I: CPT | Performed by: INTERNAL MEDICINE

## 2021-01-28 ENCOUNTER — IMMUNIZATION (OUTPATIENT)
Dept: LAB | Age: 26
End: 2021-01-28
Attending: HOSPITALIST

## 2021-01-28 ENCOUNTER — OFFICE VISIT (OUTPATIENT)
Dept: CARDIOLOGY | Age: 26
End: 2021-01-28

## 2021-01-28 VITALS
SYSTOLIC BLOOD PRESSURE: 108 MMHG | HEIGHT: 62 IN | DIASTOLIC BLOOD PRESSURE: 60 MMHG | BODY MASS INDEX: 29.26 KG/M2 | WEIGHT: 159 LBS | HEART RATE: 64 BPM

## 2021-01-28 DIAGNOSIS — Z23 NEED FOR VACCINATION: Primary | ICD-10-CM

## 2021-01-28 DIAGNOSIS — R00.2 PALPITATIONS: Primary | ICD-10-CM

## 2021-01-28 DIAGNOSIS — I49.3 PVC'S (PREMATURE VENTRICULAR CONTRACTIONS): ICD-10-CM

## 2021-01-28 PROCEDURE — 91301 COVID-19 MODERNA VACCINE: CPT

## 2021-01-28 PROCEDURE — 99212 OFFICE O/P EST SF 10 MIN: CPT | Performed by: INTERNAL MEDICINE

## 2021-01-28 PROCEDURE — 0012A COVID-19 MODERNA VACCINE: CPT

## 2021-01-28 SDOH — HEALTH STABILITY: PHYSICAL HEALTH: ON AVERAGE, HOW MANY DAYS PER WEEK DO YOU ENGAGE IN MODERATE TO STRENUOUS EXERCISE (LIKE A BRISK WALK)?: 4 DAYS

## 2021-01-28 ASSESSMENT — ENCOUNTER SYMPTOMS
SHORTNESS OF BREATH: 0
HEMATOCHEZIA: 0
HEMOPTYSIS: 0
COUGH: 0
CHILLS: 0
ALLERGIC/IMMUNOLOGIC COMMENTS: NO NEW FOOD ALLERGIES
FEVER: 0
WEIGHT GAIN: 0
LIGHT-HEADEDNESS: 1
WEIGHT LOSS: 0
SUSPICIOUS LESIONS: 0
BRUISES/BLEEDS EASILY: 0

## 2021-02-23 ENCOUNTER — HOSPITAL ENCOUNTER (OUTPATIENT)
Dept: ULTRASOUND IMAGING | Age: 26
Discharge: HOME | End: 2021-02-23
Attending: INTERNAL MEDICINE

## 2021-02-23 DIAGNOSIS — E04.9 ENLARGED THYROID: ICD-10-CM

## 2021-02-23 PROCEDURE — 76536 US EXAM OF HEAD AND NECK: CPT

## 2021-02-25 ENCOUNTER — TELEPHONE (OUTPATIENT)
Dept: SURGERY | Age: 26
End: 2021-02-25

## 2021-07-19 ENCOUNTER — HOSPITAL ENCOUNTER (EMERGENCY)
Facility: HOSPITAL | Age: 26
Discharge: HOME | End: 2021-07-19
Attending: EMERGENCY MEDICINE
Payer: MEDICAID

## 2021-07-19 VITALS
RESPIRATION RATE: 18 BRPM | OXYGEN SATURATION: 98 % | DIASTOLIC BLOOD PRESSURE: 50 MMHG | WEIGHT: 156 LBS | SYSTOLIC BLOOD PRESSURE: 119 MMHG | BODY MASS INDEX: 29 KG/M2 | HEART RATE: 87 BPM | TEMPERATURE: 98 F

## 2021-07-19 DIAGNOSIS — B35.4 TINEA CORPORIS: Primary | ICD-10-CM

## 2021-07-19 PROCEDURE — 99283 EMERGENCY DEPT VISIT LOW MDM: CPT

## 2021-07-19 RX ORDER — CLOTRIMAZOLE 1 %
CREAM (GRAM) TOPICAL
Qty: 1 EACH | Refills: 0 | Status: SHIPPED | OUTPATIENT
Start: 2021-07-19

## 2021-07-20 NOTE — ED PROVIDER NOTES
Patient Seen in: [facility] Emergency Department      History   Patient presents with:  Rash Skin Problem    Stated Complaint: RASH TO R HAND    HPI/Subjective:   HPI    Patient is 24-year-old says she noticed a rash on her right hand been there few perfused, without cyanosis. There are 2 circular patches daily erythematous rash on the back of the right hand. One has a central clearing that looks like ringworm. NEUROLOGIC: Cranial nerves II through XII are intact moving all extremities normally.   Ayla Torres

## 2021-07-20 NOTE — ED INITIAL ASSESSMENT (HPI)
Pt to the emergency room for two ring like areas of redness that appeared yesterday. 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  Pt reports affected area is itchy but not painful

## 2022-02-20 NOTE — MR AVS SNAPSHOT
After Visit Summary   7/2/2020    Darvin Brown    MRN: [id number]           Visit Information     Date & Time  7/2/2020 10:45 AM Provider  [facility]  [address]  Dept[address]  Phone  [phone]      [facility]                                                           Daily Progress Note                                                                 Pt Name: Jamie Acevedo Record Number: [id number]  Date of Birth 1944   Today's Date: 2/20/2022      ASSESSMENT/PLAN  GI bleed s/p sigmoid colectomy 1/26/22  -flex sig 2/17 friable surgical anastomosis 18 cm from rectal verge, two oozing areas with hemoclips placed.   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 Continue to trend every 6 hours. If stable through today with no signs of ongoing bleeding, OK to start heparin gtt tomorrow for PE.  - PE with heart strain - heparin gtt on hold. Off of pressors. LLE DVT seen on venous duplex  - ESRD - HD per nephrology          Subjective  Mr. Yordan Roth had a small amount of rectal bleeding overnight. His vitals have remained stable. He denies any abdominal pain. OBJECTIVE  VITALS:   Vitals:    02/20/22 0400 02/20/22 0500 02/20/22 0600 02/20/22 0812   BP: (!) 124/101 (!) 127/58     Pulse: 90 90     Resp: 15 15  18   Temp: 98.7 °F (37.1 °C)      TempSrc: Oral      SpO2: 97%   93%   Weight:   225 lb 5 oz (102.2 kg)    Height:         I/O last 3 completed shifts: In: 4665.5 [I.V.:1478.2; Blood:2587.8;  IV Piggyback:599.5]  Out: -   GENERAL: alert, no distress  Pulmonary: normal respiratory effort, no accessory muscle use  ABDOMEN: soft, NT, ND  Ext: no cyanosis or clubbing          LABS  CBC:   Lab Results   Component Value Date    WBC 6.0 02/20/2022    RBC 2.58 02/20/2022    HGB 7.8 02/20/2022    HCT 23.4 02/20/2022    MCV 90.6 02/20/2022    MCH 30.1 02/20/2022    MCHC 33.2 02/20/2022    RDW 14.5 02/20/2022     02/20/2022    MPV 6.9 02/20/2022     BMP:    Lab Results   Component Value Date     02/20/2022    K 4.7 02/20/2022    K 4.3 02/12/2022     02/20/2022    CO2 23 02/20/2022    BUN 30 02/20/2022    LABALBU 2.5 02/12/2022    CREATININE 5.5 02/20/2022    CALCIUM 8.1 02/20/2022    GFRAA 12 02/20/2022    GFRAA 35 07/26/2011    LABGLOM 10 02/20/2022    GLUCOSE 91 02/20/2022         Sarai Gonzalez MD  Electronically signed 2/20/2022 at 8:38 AM Tips for making healthy food choices    Enjoy your food, but eat less. Fully enjoy your food when eating. Don’t eat while distracted and slow down. Avoid over sized portions. Don’t eat while when you’re bored.      EAT THESE FOODS MORE OFTEN: EAT THE [email or web address]                   DO YOU KNOW WHERE TO GO? Injury & Illness are never convenient. If you are dealing with a   non-emergency, consider your options before heading to an ER.   VIDEO VISITS  Visit face-to-face with visit [email or web address] or call [phone]. ([phone])

## 2022-03-17 ENCOUNTER — OFFICE VISIT (OUTPATIENT)
Dept: OBGYN CLINIC | Facility: CLINIC | Age: 27
End: 2022-03-17
Payer: MEDICAID

## 2022-03-17 VITALS
DIASTOLIC BLOOD PRESSURE: 66 MMHG | HEART RATE: 95 BPM | WEIGHT: 157.63 LBS | BODY MASS INDEX: 29.01 KG/M2 | SYSTOLIC BLOOD PRESSURE: 112 MMHG | HEIGHT: 62 IN

## 2022-03-17 DIAGNOSIS — Z12.4 CERVICAL CANCER SCREENING: ICD-10-CM

## 2022-03-17 DIAGNOSIS — Z01.419 ENCOUNTER FOR WELL WOMAN EXAM WITH ROUTINE GYNECOLOGICAL EXAM: Primary | ICD-10-CM

## 2022-03-17 LAB
CYTOLOGY CVX/VAG DOC THIN PREP: NORMAL
HPV16+18+45 E6+E7MRNA CVX NAA+PROBE: NEGATIVE

## 2022-03-17 PROCEDURE — 99395 PREV VISIT EST AGE 18-39: CPT | Performed by: OBSTETRICS & GYNECOLOGY

## 2022-03-17 PROCEDURE — 3008F BODY MASS INDEX DOCD: CPT | Performed by: OBSTETRICS & GYNECOLOGY

## 2022-03-17 PROCEDURE — 3078F DIAST BP <80 MM HG: CPT | Performed by: OBSTETRICS & GYNECOLOGY

## 2022-03-17 PROCEDURE — 88175 CYTOPATH C/V AUTO FLUID REDO: CPT | Performed by: OBSTETRICS & GYNECOLOGY

## 2022-03-17 PROCEDURE — 3074F SYST BP LT 130 MM HG: CPT | Performed by: OBSTETRICS & GYNECOLOGY

## 2022-10-26 ENCOUNTER — OFFICE VISIT (OUTPATIENT)
Dept: OBGYN CLINIC | Facility: CLINIC | Age: 27
End: 2022-10-26
Payer: MEDICAID

## 2022-10-26 VITALS
BODY MASS INDEX: 30.91 KG/M2 | DIASTOLIC BLOOD PRESSURE: 65 MMHG | HEART RATE: 88 BPM | SYSTOLIC BLOOD PRESSURE: 118 MMHG | WEIGHT: 168 LBS | HEIGHT: 62 IN

## 2022-10-26 DIAGNOSIS — L65.9 HAIR LOSS: ICD-10-CM

## 2022-10-26 DIAGNOSIS — N92.6 IRREGULAR MENSES: Primary | ICD-10-CM

## 2022-10-26 PROCEDURE — 3008F BODY MASS INDEX DOCD: CPT | Performed by: OBSTETRICS & GYNECOLOGY

## 2022-10-26 PROCEDURE — 3074F SYST BP LT 130 MM HG: CPT | Performed by: OBSTETRICS & GYNECOLOGY

## 2022-10-26 PROCEDURE — 99213 OFFICE O/P EST LOW 20 MIN: CPT | Performed by: OBSTETRICS & GYNECOLOGY

## 2022-10-26 PROCEDURE — 3078F DIAST BP <80 MM HG: CPT | Performed by: OBSTETRICS & GYNECOLOGY

## 2023-08-16 ENCOUNTER — OFFICE VISIT (OUTPATIENT)
Dept: OCCUPATIONAL MEDICINE | Age: 28
End: 2023-08-16
Attending: FAMILY MEDICINE

## 2023-08-16 DIAGNOSIS — Z01.84 IMMUNITY STATUS TESTING: Primary | ICD-10-CM

## 2023-08-16 PROCEDURE — 86765 RUBEOLA ANTIBODY: CPT

## 2023-08-16 PROCEDURE — 86787 VARICELLA-ZOSTER ANTIBODY: CPT

## 2023-08-16 PROCEDURE — 86762 RUBELLA ANTIBODY: CPT

## 2023-08-16 PROCEDURE — 86735 MUMPS ANTIBODY: CPT

## 2023-08-16 PROCEDURE — 86480 TB TEST CELL IMMUN MEASURE: CPT

## 2023-08-16 PROCEDURE — 86706 HEP B SURFACE ANTIBODY: CPT

## 2023-08-17 LAB
HBV SURFACE AB SER QL: NONREACTIVE
HBV SURFACE AB SERPL IA-ACNC: <3.1 MIU/ML
M TB IFN-G CD4+ T-CELLS BLD-ACNC: 0.02 IU/ML
M TB TUBERC IFN-G BLD QL: NEGATIVE
M TB TUBERC IGNF/MITOGEN IGNF CONTROL: >10 IU/ML
QFT TB1 AG MINUS NIL: 0.01 IU/ML
QFT TB2 AG MINUS NIL: 0.02 IU/ML
RUBV IGG SER QL: NEGATIVE
RUBV IGG SER-ACNC: <0.2 IU/ML (ref 10–?)

## 2023-08-18 LAB
MEV IGG SER-ACNC: <5 AU/ML (ref 16.5–?)
MUV IGG SER IA-ACNC: 78.6 AU/ML (ref 11–?)
VZV IGG SER IA-ACNC: 185.6 (ref 165–?)

## 2023-08-19 ENCOUNTER — OFFICE VISIT (OUTPATIENT)
Dept: OCCUPATIONAL MEDICINE | Age: 28
End: 2023-08-19
Attending: PHYSICIAN ASSISTANT

## 2023-08-22 ENCOUNTER — OFFICE VISIT (OUTPATIENT)
Dept: OCCUPATIONAL MEDICINE | Age: 28
End: 2023-08-22
Attending: PHYSICIAN ASSISTANT

## 2023-10-19 DIAGNOSIS — R74.01 ELEVATED TRANSAMINASE LEVEL: ICD-10-CM

## 2023-10-19 DIAGNOSIS — R74.8 ALKALINE PHOSPHATASE RAISED: Primary | ICD-10-CM

## 2023-11-18 ENCOUNTER — HOSPITAL ENCOUNTER (OUTPATIENT)
Dept: ULTRASOUND IMAGING | Age: 28
Discharge: HOME | End: 2023-11-18
Attending: INTERNAL MEDICINE

## 2023-11-18 DIAGNOSIS — R74.8 ALKALINE PHOSPHATASE RAISED: ICD-10-CM

## 2023-11-18 DIAGNOSIS — R74.01 ELEVATED TRANSAMINASE LEVEL: ICD-10-CM

## 2023-11-18 PROCEDURE — 76705 ECHO EXAM OF ABDOMEN: CPT

## 2024-04-30 ENCOUNTER — TELEPHONE (OUTPATIENT)
Dept: FAMILY MEDICINE | Age: 29
End: 2024-04-30

## 2024-05-01 ENCOUNTER — APPOINTMENT (OUTPATIENT)
Dept: FAMILY MEDICINE | Age: 29
End: 2024-05-01

## 2024-05-01 VITALS
OXYGEN SATURATION: 97 % | WEIGHT: 197 LBS | DIASTOLIC BLOOD PRESSURE: 75 MMHG | SYSTOLIC BLOOD PRESSURE: 120 MMHG | BODY MASS INDEX: 34.91 KG/M2 | RESPIRATION RATE: 16 BRPM | HEIGHT: 63 IN | HEART RATE: 90 BPM | TEMPERATURE: 98.2 F

## 2024-05-01 DIAGNOSIS — Z00.00 ANNUAL PHYSICAL EXAM: Primary | ICD-10-CM

## 2024-05-01 DIAGNOSIS — E66.09 CLASS 2 OBESITY DUE TO EXCESS CALORIES WITHOUT SERIOUS COMORBIDITY WITH BODY MASS INDEX (BMI) OF 35.0 TO 35.9 IN ADULT: ICD-10-CM

## 2024-05-01 PROCEDURE — 99385 PREV VISIT NEW AGE 18-39: CPT | Performed by: FAMILY MEDICINE

## 2024-05-01 RX ORDER — SEMAGLUTIDE 0.5 MG/.5ML
INJECTION, SOLUTION SUBCUTANEOUS
COMMUNITY

## 2024-05-01 RX ORDER — EPINEPHRINE 0.3 MG/.3ML
INJECTION SUBCUTANEOUS
COMMUNITY

## 2024-05-01 RX ORDER — ONDANSETRON 4 MG/1
TABLET, ORALLY DISINTEGRATING ORAL
COMMUNITY
Start: 2024-04-30

## 2024-05-01 ASSESSMENT — PATIENT HEALTH QUESTIONNAIRE - PHQ9
CLINICAL INTERPRETATION OF PHQ2 SCORE: NO FURTHER SCREENING NEEDED
SUM OF ALL RESPONSES TO PHQ9 QUESTIONS 1 AND 2: 0
2. FEELING DOWN, DEPRESSED OR HOPELESS: NOT AT ALL
1. LITTLE INTEREST OR PLEASURE IN DOING THINGS: NOT AT ALL
SUM OF ALL RESPONSES TO PHQ9 QUESTIONS 1 AND 2: 0

## 2024-08-09 ENCOUNTER — HOSPITAL ENCOUNTER (OUTPATIENT)
Age: 29
Discharge: HOME | End: 2024-08-09
Payer: MEDICAID

## 2024-08-09 VITALS
RESPIRATION RATE: 18 BRPM | SYSTOLIC BLOOD PRESSURE: 119 MMHG | TEMPERATURE: 98 F | OXYGEN SATURATION: 99 % | DIASTOLIC BLOOD PRESSURE: 63 MMHG | HEART RATE: 95 BPM

## 2024-08-09 DIAGNOSIS — R05.1 ACUTE COUGH: ICD-10-CM

## 2024-08-09 DIAGNOSIS — Z20.822 ENCOUNTER FOR LABORATORY TESTING FOR COVID-19 VIRUS: ICD-10-CM

## 2024-08-09 DIAGNOSIS — U07.1 COVID-19 VIRUS INFECTION: Primary | ICD-10-CM

## 2024-08-09 LAB — SARS-COV-2 RNA RESP QL NAA+PROBE: DETECTED

## 2024-08-09 PROCEDURE — U0002 COVID-19 LAB TEST NON-CDC: HCPCS | Performed by: PHYSICIAN ASSISTANT

## 2024-08-09 PROCEDURE — 99213 OFFICE O/P EST LOW 20 MIN: CPT | Performed by: PHYSICIAN ASSISTANT

## 2024-08-09 NOTE — ED PROVIDER NOTES
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  Onset: 3 days.  Associated subjective fever, chills, sweats, nasal congestion, chest congestion, cough.  Taking over-the-counter multi symptoms cold medication with minimal improvement.  Coming to immediate care for further evaluation.  Home COVID testing negative.  Afebrile in clinic.  No chest pain or shortness of breath.  No lethargy, weakness, confusion.  Not immunocompromise.  Works in healthcare.  Recent COVID exposure.    Objective:   No pertinent past medical history.            No pertinent past surgical history.              No pertinent social history.            Review of Systems   Constitutional:  Negative for chills and fever.   HENT:  Positive for congestion.    Respiratory:  Positive for cough.    Gastrointestinal:  Negative for abdominal pain, nausea and vomiting.   Musculoskeletal:  Negative for back pain, gait problem, neck pain and neck stiffness.   Skin:  Negative for rash.   Allergic/Immunologic: Negative for immunocompromised state.   Psychiatric/Behavioral:  Negative for confusion.        Positive for stated Chief Complaint: Nasal Congestion and Cough    Other systems are as noted in HPI.  Constitutional and vital signs reviewed.      All other systems reviewed and negative except as noted above.    Physical Exam     ED Triage Vitals [08/09/24 1117]   /63   Pulse 95   Resp 18   Temp 97.5 °F (36.4 °C)   Temp src Temporal   SpO2 99 %   O2 Device None (Room air)       Current Vitals:   Vital Signs  BP: 119/63  Pulse: 95  Resp: 18  Temp: 97.5 °F (36.4 °C)  Temp src: Temporal    Oxygen Therapy  SpO2: 99 %  O2 Device: None (Room  air)            Physical Exam  Vitals and nursing note reviewed.   Constitutional:       General: She is not in acute distress.     Appearance: Normal appearance. She is well-developed. She is not ill-appearing, toxic-appearing or diaphoretic.   HENT:      Head: Normocephalic and atraumatic.      Nose: Congestion present.      Mouth/Throat:      Mouth: Mucous membranes are moist.   Eyes:      General: No scleral icterus.  Cardiovascular:      Rate and Rhythm: Normal rate and regular rhythm.   Pulmonary:      Effort: Pulmonary effort is normal. No respiratory distress.      Breath sounds: Normal breath sounds. No wheezing or rales.   Musculoskeletal:         General: Normal range of motion.   Skin:     Findings: No rash.   Neurological:      General: No focal deficit present.      Mental Status: She is alert and oriented to person, place, and time.      Motor: No weakness.      Coordination: Coordination normal.   Psychiatric:         Mood and Affect: Mood normal.         Behavior: Behavior normal.           ED Course     Labs Reviewed   RAPID SARS-COV-2 BY PCR - Abnormal; Notable for the following components:       Result Value    Rapid SARS-CoV-2 by PCR Detected (*)     All other components within normal limits     Results for orders placed or performed during the hospital encounter of 08/09/24   Rapid SARS-CoV-2 by PCR    Collection Time: 08/09/24 11:26 AM    Specimen: Nares; Other   Result Value Ref Range    Rapid SARS-CoV-2 by PCR Detected (A) Not Detected            MDM     Dx: COVID Virus Infection, Initial Encounter  COVID PCR Positive  No severe disease  No hypoxia  Not requiring hospitalization  Lungs: Clear to Auscultation Bilaterally  No chest pain or shortness of breath  Overall well-appearing  Hemodynamically stable  Afebrile  Tolerating PO  Outpatient management  Supportive care  Quarantine/Isolation  Rest  Oral hydration  Motrin/Tylenol as needed for pain/fever/myalgias  Tessalon and  DM for  Anti-tussive  Risks/Benefits of oral antiviral Paxlovid discussed   Declined Paxlovid oral antiviral for Covid infection  PCP follow  ED Return precaution  Discharge instructions COVID                                   Medical Decision Making      Disposition and Plan     Clinical Impression:  1. COVID-19 virus infection    2. Acute cough    3. Encounter for laboratory testing for COVID-19 virus         Disposition:  Discharge  8/9/2024 11:49 am    Follow-up:  Migel Angel MD  [address] [facility] [place] [address]  [phone]                Medications Prescribed:  Current Discharge Medication List

## 2025-02-13 ENCOUNTER — OFFICE VISIT (OUTPATIENT)
Dept: OBGYN CLINIC | Facility: CLINIC | Age: 30
End: 2025-02-13

## 2025-02-13 VITALS
HEART RATE: 120 BPM | HEIGHT: 62 IN | WEIGHT: 170 LBS | BODY MASS INDEX: 31.28 KG/M2 | SYSTOLIC BLOOD PRESSURE: 123 MMHG | DIASTOLIC BLOOD PRESSURE: 85 MMHG

## 2025-02-13 DIAGNOSIS — N91.4 SECONDARY OLIGOMENORRHEA: ICD-10-CM

## 2025-02-13 DIAGNOSIS — Z01.419 NORMAL GYNECOLOGIC EXAMINATION: Primary | ICD-10-CM

## 2025-02-13 PROCEDURE — 99385 PREV VISIT NEW AGE 18-39: CPT | Performed by: OBSTETRICS & GYNECOLOGY

## 2025-02-13 NOTE — PROGRESS NOTES
Caitlyn Montero is a 29 year old female  Patient's last menstrual period was 2025.   Chief Complaint   Patient presents with    Gyn Exam     Pt here for gyne exam    Pt does not want contraception. Menses every 3 to 4 mos, lasting 7 days. Pt for annual exam. Menses 1/7 and 3 wks later brown spotting. 2 days ago light brown spotting.     OBSTETRICS HISTORY:     OB History    Para Term  AB Living   0 0 0 0 0 0   SAB IAB Ectopic Multiple Live Births   0 0 0 0 0       GYNE HISTORY:     Hx Prior Abnormal Pap: No  Pap Date: 22   Hx Prior Abnormal Pap: No (2025  1:04 PM)  Pap Date: 22 (2025  1:04 PM)        Latest Ref Rng & Units 3/17/2022     5:25 PM 2020    11:31 AM   RECENT PAP RESULTS   INTERPRETATION/RESULT: Negative for intraepithelial lesion or malignancy Negative for intraepithelial lesion or malignancy  Negative for intraepithelial lesion or malignancy          MEDICAL HISTORY:     Past Medical History:    Amenorrhea, secondary    Hyperhidrosis    IBS (irritable bowel syndrome)    OCD (obsessive compulsive disorder)    Panic disorder    Thyroid disorder       SURGICAL HISTORY:     History reviewed. No pertinent surgical history.    SOCIAL HISTORY:     Social History     Socioeconomic History    Marital status: Single   Tobacco Use    Smoking status: Never    Smokeless tobacco: Never   Vaping Use    Vaping status: Never Used   Substance and Sexual Activity    Alcohol use: Yes     Comment: socially    Drug use: No    Sexual activity: Yes     Partners: Male     Birth control/protection: Condom   Social History Narrative    ** Merged History Encounter **         ** Merged History Encounter **             FAMILY HISTORY:     Family History   Problem Relation Age of Onset    Other (panic attacks) Mother     Other (panic attacks) Father        MEDICATIONS:       Current Outpatient Medications:     clotrimazole 1 % External Cream, To rash twice a day for 2 weeks.  (Patient not taking: Reported on 3/17/2022), Disp: 1 each, Rfl: 0    Clobetasol Propionate 0.05 % External Cream, Apply 1 Application topically 2 (two) times daily. (Patient not taking: Reported on 3/17/2022), Disp: 1 Tube, Rfl: 0    Cholecalciferol (VITAMIN D3) 25 MCG (1000 UT) Oral Cap, Take 1 tablet by mouth daily. (Patient not taking: Reported on 2/13/2025), Disp: , Rfl:     Coenzyme Q10 (COQ-10 OR), Take 1 capsule by mouth daily.   (Patient not taking: Reported on 2/13/2025), Disp: , Rfl:     ALLERGIES:     Allergies[1]      REVIEW OF SYSTEMS:     Constitutional:    denies fever / chills  Eyes:     denies blurred or double vision  Cardiovascular:  denies chest pain or palpitations  Respiratory:    denies shortness of breath  Gastrointestinal:  denies severe abdominal pain, frequent diarrhea or constipation, nausea / vomiting  Genitourinary:    denies dysuria, bothersome incontinence  Skin/Breast:   denies any breast pain, lumps, or discharge  Neurological:    denies frequent severe headaches  Psychiatric:   denies depression or anxiety, thoughts of harming self or others  Heme/Lymph:    denies easy bruising or bleeding      PHYSICAL EXAM:   Blood pressure 123/85, pulse 120, height 5' 2\" (1.575 m), weight 170 lb (77.1 kg), last menstrual period 01/07/2025, not currently breastfeeding.  Constitutional:  well developed, well nourished  Head/Face:  normocephalic  Neck/Thyroid: thyroid symmetric, no thyromegaly, no nodules, no adenopathy  Lymphatic: no abnormal supraclavicular or axillary adenopathy is noted  Respiratory:      chest wall symmetric and nontender on palpation, clear to asculation bilateral, no wheezing, rales, ronchi, and resonance normal upon percussion  Cardiovascular: chest normal in appearance, regular rate and rhythm, no murmurs, PMI palpated midclavicular line  Breast:   normal bilaterally without palpable masses, tenderness, asymmetry, nipple discharge, nipple retraction or skin changes  bilaterally  Abdomen:   soft, nontender, nondistended, no masses  Skin/Hair:  no unusual rashes or bruises  Extremities:  no edema, no cyanosis, non tender bilaterally  Psychiatric:   oriented to time, place, person and situation. Appropriate mood and affect    Pelvic Exam:  External Genitalia:  normal appearance, hair distribution, and no lesions  Urethral Meatus:   normal in size, location, without lesions   Bladder:    no fullness, masses or tenderness  Vagina:    normal appearance without lesions, no abnormal discharge  Cervix:    No lesions, normal friability   Uterus:    normal in size, 8 wk sized, normal contour, position, mobility, without  motion tenderness  Adnexa:   normal without masses or tenderness  Perineum:   normal  Anus: no hemorroids         ASSESSMENT & PLAN:     Caitlyn was seen today for gyn exam.    Diagnoses and all orders for this visit:    Normal gynecologic examination  -     ThinPrep Pap with HPV Reflex, Chlamydia/GC; Future  -     Chlamydia/Gc Amplification  Nutrition, weight screening and exercise were discussed with the patient.  Exercise should encompass approximately 150 minutes/week.  Self breast exam counseling was also given.  I advised the patient to avoid tobacco, drugs and alcohol.  Influenza vaccine was offered if seasonally appropriate.  HPV and STD prevention counseling was given.  Health maintenance checklist  was reviewed including Pap smear, cervical cultures, and mammogram screening if age-appropriate.  Appropriate follow-up scheduling was discussed with the patient.    Pap done  Secondary oligomenorrhea  -     US PELVIS (TRANSABDOMINAL AND TRANSVAGINAL) (CPT=76856/91646); Future    I discussed with the patient that her BMI is greater than 30 and that this most likely is contributing to her oligomenorrhea.  I offered birth control pill but she has had rashes with use of the birth control in the past so we decided not to go that route.  I offered daily progesterone for  the first 10 days of every month if her ultrasound is normal but we decided not to go that route because of previous rash with the birth control pill.  If the ultrasound is normal and shows normal anatomy which are consistent with my exam today and no endometrial thickness or polyps then we will use expectant management and she is happy with this decision.      FOLLOW-UP     Return in about 1 year (around 2/13/2026) for Annual exam.      Alexi Whalen MD  2/13/2025         [1]   Allergies  Allergen Reactions    Sertraline OTHER (SEE COMMENTS)     Increased heart rate / tremors    Penicillins RASH

## 2025-02-14 LAB
C TRACH DNA SPEC QL NAA+PROBE: NEGATIVE
N GONORRHOEA DNA SPEC QL NAA+PROBE: NEGATIVE

## 2025-03-17 ENCOUNTER — HOSPITAL ENCOUNTER (EMERGENCY)
Facility: HOSPITAL | Age: 30
Discharge: HOME | End: 2025-03-17
Attending: EMERGENCY MEDICINE
Payer: MEDICAID

## 2025-03-17 VITALS
RESPIRATION RATE: 16 BRPM | DIASTOLIC BLOOD PRESSURE: 59 MMHG | SYSTOLIC BLOOD PRESSURE: 124 MMHG | HEART RATE: 124 BPM | TEMPERATURE: 99 F | WEIGHT: 165 LBS | BODY MASS INDEX: 30 KG/M2 | OXYGEN SATURATION: 100 %

## 2025-03-17 DIAGNOSIS — E86.0 DEHYDRATION: ICD-10-CM

## 2025-03-17 DIAGNOSIS — K52.9 GASTROENTERITIS: Primary | ICD-10-CM

## 2025-03-17 LAB
ANION GAP SERPL CALC-SCNC: 10 MMOL/L (ref 0–18)
BASOPHILS # BLD AUTO: 0.02 X10(3) UL (ref 0–0.2)
BASOPHILS NFR BLD AUTO: 0.2 %
BUN BLD-MCNC: 15 MG/DL (ref 9–23)
BUN/CREAT SERPL: 20.5 (ref 10–20)
CALCIUM BLD-MCNC: 9.3 MG/DL (ref 8.7–10.4)
CHLORIDE SERPL-SCNC: 104 MMOL/L (ref 98–112)
CO2 SERPL-SCNC: 25 MMOL/L (ref 21–32)
CREAT BLD-MCNC: 0.73 MG/DL
DEPRECATED RDW RBC AUTO: 38.5 FL (ref 35.1–46.3)
EGFRCR SERPLBLD CKD-EPI 2021: 116 ML/MIN/1.73M2 (ref 60–?)
EOSINOPHIL # BLD AUTO: 0.03 X10(3) UL (ref 0–0.7)
EOSINOPHIL NFR BLD AUTO: 0.3 %
ERYTHROCYTE [DISTWIDTH] IN BLOOD BY AUTOMATED COUNT: 12.1 % (ref 11–15)
GLUCOSE BLD-MCNC: 106 MG/DL (ref 70–99)
HCT VFR BLD AUTO: 41.3 %
HGB BLD-MCNC: 14.5 G/DL
IMM GRANULOCYTES # BLD AUTO: 0.04 X10(3) UL (ref 0–1)
IMM GRANULOCYTES NFR BLD: 0.4 %
LYMPHOCYTES # BLD AUTO: 0.56 X10(3) UL (ref 1–4)
LYMPHOCYTES NFR BLD AUTO: 6.3 %
MCH RBC QN AUTO: 30.2 PG (ref 26–34)
MCHC RBC AUTO-ENTMCNC: 35.1 G/DL (ref 31–37)
MCV RBC AUTO: 86 FL
MONOCYTES # BLD AUTO: 0.5 X10(3) UL (ref 0.1–1)
MONOCYTES NFR BLD AUTO: 5.6 %
NEUTROPHILS # BLD AUTO: 7.76 X10 (3) UL (ref 1.5–7.7)
NEUTROPHILS # BLD AUTO: 7.76 X10(3) UL (ref 1.5–7.7)
NEUTROPHILS NFR BLD AUTO: 87.2 %
OSMOLALITY SERPL CALC.SUM OF ELEC: 289 MOSM/KG (ref 275–295)
PLATELET # BLD AUTO: 331 10(3)UL (ref 150–450)
POTASSIUM SERPL-SCNC: 4.2 MMOL/L (ref 3.5–5.1)
RBC # BLD AUTO: 4.8 X10(6)UL
SODIUM SERPL-SCNC: 139 MMOL/L (ref 136–145)
WBC # BLD AUTO: 8.9 X10(3) UL (ref 4–11)

## 2025-03-17 PROCEDURE — 96375 TX/PRO/DX INJ NEW DRUG ADDON: CPT

## 2025-03-17 PROCEDURE — 99284 EMERGENCY DEPT VISIT MOD MDM: CPT

## 2025-03-17 PROCEDURE — S0028 INJECTION, FAMOTIDINE, 20 MG: HCPCS | Performed by: EMERGENCY MEDICINE

## 2025-03-17 PROCEDURE — 85025 COMPLETE CBC W/AUTO DIFF WBC: CPT | Performed by: EMERGENCY MEDICINE

## 2025-03-17 PROCEDURE — 96361 HYDRATE IV INFUSION ADD-ON: CPT

## 2025-03-17 PROCEDURE — 80048 BASIC METABOLIC PNL TOTAL CA: CPT | Performed by: EMERGENCY MEDICINE

## 2025-03-17 PROCEDURE — 96374 THER/PROPH/DIAG INJ IV PUSH: CPT

## 2025-03-17 RX ORDER — ONDANSETRON 2 MG/ML
4 INJECTION INTRAMUSCULAR; INTRAVENOUS ONCE
Status: COMPLETED | OUTPATIENT
Start: 2025-03-17 | End: 2025-03-17

## 2025-03-17 RX ORDER — ONDANSETRON 4 MG/1
4 TABLET, ORALLY DISINTEGRATING ORAL EVERY 4 HOURS PRN
Qty: 12 TABLET | Refills: 0 | Status: SHIPPED | OUTPATIENT
Start: 2025-03-17 | End: 2025-03-24

## 2025-03-17 RX ORDER — SACCHAROMYCES BOULARDII 250 MG
250 CAPSULE ORAL 2 TIMES DAILY
Qty: 28 CAPSULE | Refills: 0 | Status: SHIPPED | OUTPATIENT
Start: 2025-03-17 | End: 2025-03-31

## 2025-03-17 RX ORDER — FAMOTIDINE 10 MG/ML
20 INJECTION, SOLUTION INTRAVENOUS ONCE
Status: COMPLETED | OUTPATIENT
Start: 2025-03-17 | End: 2025-03-17

## 2025-03-17 NOTE — ED PROVIDER NOTES
Patient Seen in: [facility]    History     Chief Complaint   Patient presents with    Nausea/Vomiting/Diarrhea     Stated Complaint: nausea/v    HPI    Patient complains of vomiting, this began last night, non bloody, non billious.  also associated with diarrhea.  Patient co crampy mid abdominal pain, n+ norovirus  sick contacts.  No fever or chills.    Past Medical History:    Amenorrhea, secondary    Hyperhidrosis    IBS (irritable bowel syndrome)    OCD (obsessive compulsive disorder)    Panic disorder    Thyroid disorder       No past surgical history on file.         Family History   Problem Relation Age of Onset    Other (panic attacks) Mother     Other (panic attacks) Father        Social History     Socioeconomic History    Marital status: Single   Tobacco Use    Smoking status: Never    Smokeless tobacco: Never   Vaping Use    Vaping status: Never Used   Substance and Sexual Activity    Alcohol use: Yes     Comment: socially    Drug use: No    Sexual activity: Yes     Partners: Male     Birth control/protection: Condom   Social History Narrative    ** Merged History Encounter **         ** Merged History Encounter **            Review of Systems    Positive for stated complaint: nausea/v  Other systems are as noted in HPI.  Constitutional and vital signs reviewed.      All other systems reviewed and negative except as noted above.    PSFH elements reviewed from today and agreed except as otherwise stated in HPI.    Physical Exam     ED Triage Vitals [03/17/25 0722]   BP (!) 122/92   Pulse (!) 142   Resp 18   Temp 98.8 °F (37.1 °C)   Temp src Oral   SpO2 98 %   O2 Device None (Room air)       Current:/59   Pulse (!) 124   Temp 98.6 °F (37 °C) (Oral)   Resp 16   Wt 74.8 kg   LMP 01/07/2025   SpO2 100%   BMI 30.18 kg/m²   PULSE OX nl  GENERAL: awake alert   HEAD: normocephalic, atraumatic,   EYES: PERRLA, EOMI, conj sclera clear  THROAT: mmm, no lesions  NECK: supple,  no meningeal signs  LUNGS: no resp distress, cta bilateral  CARDIO: RRR without murmur  GI: soft tender mid epigastric region, ++ bs  EXTREMITIES: from, 5/5 strength in all 4 ext, no edema  NEURO: alert and oiented *3, 2-12 intact, no focal deficit noted  SKIN: good skin turgor, no  rashes  PSYCH: calm, cooperative,    Differential includes: viral vs. Food borne or toxin mediated vs. Enteritis vs. Ileus        ED Course     Labs Reviewed   CBC WITH DIFFERENTIAL WITH PLATELET - Abnormal; Notable for the following components:       Result Value    Neutrophil Absolute Prelim 7.76 (*)     Neutrophil Absolute 7.76 (*)     Lymphocyte Absolute 0.56 (*)     All other components within normal limits   BASIC METABOLIC PANEL (8) - Abnormal; Notable for the following components:    Glucose 106 (*)     BUN/CREA Ratio 20.5 (*)     All other components within normal limits       MDM         Re-Exam: feeling better with meds fluids will dc home    Medical Decision Making  Problems Addressed:  Dehydration: acute illness or injury with systemic symptoms  Gastroenteritis: acute illness or injury with systemic symptoms    Amount and/or Complexity of Data Reviewed  Labs: ordered. Decision-making details documented in ED Course.  Discussion of management or test interpretation with external provider(s): Pepcid/maalox    Risk  OTC drugs.  Prescription drug management.          Disposition and Plan     Clinical Impression:  1. Gastroenteritis    2. Dehydration        Disposition:  Discharge    Follow-up:  Migel Angel MD  [address] [facility] [place] [address]  [phone]    Follow up        Medications Prescribed:  Discharge Medication List as of 3/17/2025  9:37 AM        START taking these medications    Details   ondansetron 4 MG Oral Tablet Dispersible Take 1 tablet (4 mg total) by mouth every 4 (four) hours as needed for Nausea., Normal, Disp-12 tablet, R-0      saccharomyces boulardii 250 MG Oral Cap Take 1 capsule  (250 mg total) by mouth 2 (two) times daily for 14 days., Normal, Disp-28 capsule, R-0

## 2025-04-10 ENCOUNTER — OFFICE VISIT (OUTPATIENT)
Dept: OBGYN CLINIC | Facility: CLINIC | Age: 30
End: 2025-04-10

## 2025-04-10 VITALS
HEIGHT: 62 IN | SYSTOLIC BLOOD PRESSURE: 116 MMHG | DIASTOLIC BLOOD PRESSURE: 86 MMHG | WEIGHT: 162.31 LBS | BODY MASS INDEX: 29.87 KG/M2

## 2025-04-10 DIAGNOSIS — N76.0 VAGINITIS AND VULVOVAGINITIS: ICD-10-CM

## 2025-04-10 DIAGNOSIS — Z11.3 SCREENING EXAMINATION FOR STD (SEXUALLY TRANSMITTED DISEASE): ICD-10-CM

## 2025-04-10 DIAGNOSIS — N89.8 VAGINAL ITCHING: Primary | ICD-10-CM

## 2025-04-10 PROCEDURE — 99214 OFFICE O/P EST MOD 30 MIN: CPT | Performed by: OBSTETRICS & GYNECOLOGY

## 2025-04-10 RX ORDER — FLUCONAZOLE 150 MG/1
150 TABLET ORAL ONCE
Qty: 2 TABLET | Refills: 1 | Status: SHIPPED | OUTPATIENT
Start: 2025-04-10 | End: 2025-04-10

## 2025-04-10 NOTE — PROGRESS NOTES
HPI:   Caitlyn Montero is a 29 year old female who presents for a hx of vaginal discharge, poss vaginitis.  Pt counseled and all questions answered.    Wt Readings from Last 6 Encounters:   04/10/25 162 lb 4.8 oz (73.6 kg)   03/17/25 165 lb (74.8 kg)   02/13/25 170 lb (77.1 kg)   10/26/22 168 lb (76.2 kg)   03/17/22 157 lb 9.6 oz (71.5 kg)   07/19/21 156 lb (70.8 kg)     Body mass index is 29.69 kg/m².    AST (U/L)   Date Value   08/06/2020 14 (L)   07/12/2020 28   07/09/2020 15   03/17/2017 25   05/17/2014 14 (L)     ALT (U/L)   Date Value   08/06/2020 21   07/12/2020 47   07/09/2020 32   03/17/2017 61 (H)   05/17/2014 43        Current Medications[1]   Past Medical History[2]   Past Surgical History[3]   Family History[4]   Social History:   Short Social Hx on File[5]         REVIEW OF SYSTEMS:   GENERAL: feels well otherwise  SKIN: denies any unusual skin lesions  EYES:denies blurred vision or double vision  HEENT: denies nasal congestion, sinus pain or ST  LUNGS: denies shortness of breath with exertion  CARDIOVASCULAR: denies chest pain on exertion  GI: denies abdominal pain,denies heartburn  : denies dysuria, vaginal discharge or itching,periods regular   MUSCULOSKELETAL: denies back pain  NEURO: denies headaches  PSYCHE: denies depression or anxiety  HEMATOLOGIC: denies hx of anemia  ENDOCRINE: denies thyroid history  ALL/ASTHMA: denies hx of allergy or asthma    EXAM:   /86   Ht 5' 2\" (1.575 m)   Wt 162 lb 4.8 oz (73.6 kg)   LMP 03/04/2025 (Exact Date)   BMI 29.69 kg/m²   Body mass index is 29.69 kg/m².   GENERAL: well developed, well nourished,in no apparent distress  SKIN: no rashes,no suspicious lesions  HEENT: atraumatic, normocephalic  EYES:normal in appearance  NECK: supple,no adenopathy  CHEST: no chest tenderness  LUNGS: clear to auscultation  CARDIO: RRR without murmur  GI: good BS's,no masses, HSM or tenderness  :introitus with small amount of white discharge,scant discharge,cervix  is pink,no adnexal masses or tenderness, genital screen and gc/chl done.     MUSCULOSKELETAL: back is not tender,FROM of the back  EXTREMITIES: no cyanosis, clubbing or edema  NEURO: Oriented times three    Collected 2/13/2025  1:55 PM       Status: Final result       Dx: Normal gynecologic examination    Test Result Released: Yes (seen)       Messages: Seen    1 Result Note       1 Patient Communication       1  Topic        Component  Ref Range & Units (hover)      Interpretation/Result     Negative for intraepithelial lesion or malignancy     Electronically signed by Darren Nation on 2/17/2025 at 10:42 AM     Specimen Adequacy Satisfactory for evaluation. Endocervical or metaplastic cells present     General Categorization Negative for intraepithelial lesion or malignancy     Recommendations/Comments      Screened by the Thinprep Imaging System and a cytotechnologist.        Procedure      Monolayers:  1, specimen collected in Thinprep vial, 20 ml Preservcyt     Clinical Information      Z01.419 Normal Gynecologic Examination.        Reason for testing Screening     Gyn Additional Information      NOTE:  The Pap smear is a screening test that aids in the detection of cervical cancer and its precursors.  False negative and false positive results can occur. Regular sampling is recommended to minimize false negative results.     Case Report     Gynecologic Cytology                              Case: [id number]                                     Authorizing Provider:  Alexi Whalen MD       Collected:           02/13/2025 01:55 PM             Ordering Location:     [facility]    Received:            02/14/2025 11:41 AM                                    [facility] - [facility]N                                                               First Screen:          Darren Nation                                                                    Specimen:    ThinPrep Imager Screening Pap, Cervical/endocervical                       Collected 2/13/2025  1:55 PM       Status: Final result       Dx: Normal gynecologic examination    Specimen Information: Cervical/endocervical; Other   1 Result Note       1 Patient Communication      Component  Ref Range & Units (hover)    Chlamydia Trachomatis Amplified RNA Negative   Neisseria Gonorrhoeae Amplified RNA Negative   Chlam/GC Source Cervical/endocervical   Resulting Agency [facility] ([facility])             Specimen Collected: 02/13/25  1:55 PM Last Resulted: 02/14/25  1:15 PM    Shoaib as an Unsuccessful Attempt     ASSESSMENT AND PLAN:   Caitlyn Montero is a 29 year old female who presents for a hx of vaginal discharge, poss vaginitis.  Pt counseled and all questions answered.    The patient is asked to return for an annual visit.  Pt counseled, pt req empiric tx with diflucan.   Reviewed epic chart and prior results.           [1]   Current Outpatient Medications   Medication Sig Dispense Refill    fluconazole (DIFLUCAN) 150 MG Oral Tab Take 1 tablet (150 mg total) by mouth once for 1 dose. 2 tablet 1   [2]   Past Medical History:   Amenorrhea, secondary    Hyperhidrosis    IBS (irritable bowel syndrome)    OCD (obsessive compulsive disorder)    Panic disorder    Thyroid disorder   [3] No past surgical history on file.  [4]   Family History  Problem Relation Age of Onset    Other (panic attacks) Mother     Other (panic attacks) Father    [5]   Social History  Socioeconomic History    Marital status: Single   Tobacco Use    Smoking status: Never    Smokeless tobacco: Never   Vaping Use    Vaping status: Never Used   Substance and Sexual Activity    Alcohol use: Yes     Comment: socially    Drug use: No    Sexual activity: Yes     Partners: Male     Birth control/protection: Condom   Social History Narrative    ** Merged History Encounter **         ** Merged History Encounter **

## 2025-04-11 LAB
C TRACH DNA SPEC QL NAA+PROBE: NEGATIVE
N GONORRHOEA DNA SPEC QL NAA+PROBE: NEGATIVE

## 2025-04-12 LAB
BV BACTERIA DNA VAG QL NAA+PROBE: NEGATIVE
C GLABRATA DNA VAG QL NAA+PROBE: NEGATIVE
C KRUSEI DNA VAG QL NAA+PROBE: NEGATIVE
CANDIDA DNA VAG QL NAA+PROBE: POSITIVE
T VAGINALIS DNA VAG QL NAA+PROBE: NEGATIVE

## 2025-04-12 RX ORDER — FLUCONAZOLE 150 MG/1
150 TABLET ORAL ONCE
Qty: 2 TABLET | Refills: 0 | Status: SHIPPED | OUTPATIENT
Start: 2025-04-12 | End: 2025-04-12

## (undated) NOTE — LETTER
August 31, 2020    Patient: Nelida Garcia   Date of Visit: 8/31/2020       To Whom It May Concern:    Nelida Garcia was seen and treated in our emergency department on 8/31/2020. She can return to work.  I have no concern she has COVID      If you
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
## (undated) NOTE — LETTER
Date & Time: 8/6/2020, 3:17 AM  Patient: Lilibeth Oswald  Encounter Provider(s):    Deisy Ellison MD       To Whom It May Concern:    Lilibeth Oswald was seen and treated in our department on 8/6/2020.  She can return to work on 8/8/2020  If you have a

## (undated) NOTE — ED AVS SNAPSHOT
[facility] Emergency Department    [facility]  [address] [address]    Phone:  [phone]    Fax:  [address]   MRN: [id number]    Department:  [facility] Emergency Department   Date of Visit:  3/2 IF THERE IS ANY CHANGE OR WORSENING OF YOUR CONDITION, CALL YOUR PRIMARY CARE PHYSICIAN AT ONCE OR RETURN IMMEDIATELY TO THE EMERGENCY DEPARTMENT.     If you have been prescribed any medication(s), please fill your prescription right away and begin taking t